# Patient Record
Sex: FEMALE | Race: WHITE
[De-identification: names, ages, dates, MRNs, and addresses within clinical notes are randomized per-mention and may not be internally consistent; named-entity substitution may affect disease eponyms.]

---

## 2022-04-18 ENCOUNTER — HOSPITAL ENCOUNTER (INPATIENT)
Dept: HOSPITAL 54 - ER | Age: 57
LOS: 14 days | Discharge: NURSING, REHAB OR LONG-TERM CARE | DRG: 720 | End: 2022-05-02
Attending: INTERNAL MEDICINE | Admitting: INTERNAL MEDICINE
Payer: COMMERCIAL

## 2022-04-18 VITALS — DIASTOLIC BLOOD PRESSURE: 53 MMHG | SYSTOLIC BLOOD PRESSURE: 97 MMHG

## 2022-04-18 VITALS — DIASTOLIC BLOOD PRESSURE: 55 MMHG | SYSTOLIC BLOOD PRESSURE: 96 MMHG

## 2022-04-18 VITALS — SYSTOLIC BLOOD PRESSURE: 97 MMHG | DIASTOLIC BLOOD PRESSURE: 68 MMHG

## 2022-04-18 VITALS — SYSTOLIC BLOOD PRESSURE: 93 MMHG | DIASTOLIC BLOOD PRESSURE: 50 MMHG

## 2022-04-18 VITALS — SYSTOLIC BLOOD PRESSURE: 108 MMHG | DIASTOLIC BLOOD PRESSURE: 53 MMHG

## 2022-04-18 VITALS — DIASTOLIC BLOOD PRESSURE: 56 MMHG | SYSTOLIC BLOOD PRESSURE: 93 MMHG

## 2022-04-18 VITALS — SYSTOLIC BLOOD PRESSURE: 106 MMHG | DIASTOLIC BLOOD PRESSURE: 48 MMHG

## 2022-04-18 VITALS — SYSTOLIC BLOOD PRESSURE: 94 MMHG | DIASTOLIC BLOOD PRESSURE: 56 MMHG

## 2022-04-18 VITALS — HEIGHT: 61 IN | WEIGHT: 214 LBS | BODY MASS INDEX: 40.4 KG/M2

## 2022-04-18 VITALS — DIASTOLIC BLOOD PRESSURE: 72 MMHG | SYSTOLIC BLOOD PRESSURE: 100 MMHG

## 2022-04-18 VITALS — DIASTOLIC BLOOD PRESSURE: 57 MMHG | SYSTOLIC BLOOD PRESSURE: 102 MMHG

## 2022-04-18 DIAGNOSIS — E87.2: ICD-10-CM

## 2022-04-18 DIAGNOSIS — Z79.84: ICD-10-CM

## 2022-04-18 DIAGNOSIS — J18.9: ICD-10-CM

## 2022-04-18 DIAGNOSIS — I31.3: ICD-10-CM

## 2022-04-18 DIAGNOSIS — I25.2: ICD-10-CM

## 2022-04-18 DIAGNOSIS — J98.11: ICD-10-CM

## 2022-04-18 DIAGNOSIS — J96.01: ICD-10-CM

## 2022-04-18 DIAGNOSIS — N26.1: ICD-10-CM

## 2022-04-18 DIAGNOSIS — Z99.2: ICD-10-CM

## 2022-04-18 DIAGNOSIS — E66.2: ICD-10-CM

## 2022-04-18 DIAGNOSIS — D69.6: ICD-10-CM

## 2022-04-18 DIAGNOSIS — J44.9: ICD-10-CM

## 2022-04-18 DIAGNOSIS — Z20.822: ICD-10-CM

## 2022-04-18 DIAGNOSIS — Z79.4: ICD-10-CM

## 2022-04-18 DIAGNOSIS — E11.22: ICD-10-CM

## 2022-04-18 DIAGNOSIS — E87.5: ICD-10-CM

## 2022-04-18 DIAGNOSIS — N17.0: ICD-10-CM

## 2022-04-18 DIAGNOSIS — R18.8: ICD-10-CM

## 2022-04-18 DIAGNOSIS — K57.30: ICD-10-CM

## 2022-04-18 DIAGNOSIS — J90: ICD-10-CM

## 2022-04-18 DIAGNOSIS — E78.5: ICD-10-CM

## 2022-04-18 DIAGNOSIS — I50.32: ICD-10-CM

## 2022-04-18 DIAGNOSIS — K83.8: ICD-10-CM

## 2022-04-18 DIAGNOSIS — J96.02: ICD-10-CM

## 2022-04-18 DIAGNOSIS — I13.2: ICD-10-CM

## 2022-04-18 DIAGNOSIS — M46.47: ICD-10-CM

## 2022-04-18 DIAGNOSIS — Z79.899: ICD-10-CM

## 2022-04-18 DIAGNOSIS — I21.A1: ICD-10-CM

## 2022-04-18 DIAGNOSIS — A41.9: Primary | ICD-10-CM

## 2022-04-18 DIAGNOSIS — D50.9: ICD-10-CM

## 2022-04-18 DIAGNOSIS — E87.70: ICD-10-CM

## 2022-04-18 DIAGNOSIS — R65.21: ICD-10-CM

## 2022-04-18 DIAGNOSIS — I70.0: ICD-10-CM

## 2022-04-18 DIAGNOSIS — Z79.51: ICD-10-CM

## 2022-04-18 DIAGNOSIS — D25.9: ICD-10-CM

## 2022-04-18 DIAGNOSIS — E11.649: ICD-10-CM

## 2022-04-18 DIAGNOSIS — R59.0: ICD-10-CM

## 2022-04-18 DIAGNOSIS — E87.1: ICD-10-CM

## 2022-04-18 LAB
ALBUMIN SERPL BCP-MCNC: 2.5 G/DL (ref 3.4–5)
ALP SERPL-CCNC: 129 U/L (ref 46–116)
ALT SERPL W P-5'-P-CCNC: 27 U/L (ref 12–78)
AST SERPL W P-5'-P-CCNC: 18 U/L (ref 15–37)
BASE EXCESS BLDA CALC-SCNC: -6.3 MMOL/L
BASOPHILS # BLD AUTO: 0.2 K/UL (ref 0–0.2)
BASOPHILS NFR BLD AUTO: 1.2 % (ref 0–2)
BILIRUB DIRECT SERPL-MCNC: 0.1 MG/DL (ref 0–0.2)
BILIRUB SERPL-MCNC: 0.4 MG/DL (ref 0.2–1)
BILIRUB UR QL STRIP: NEGATIVE
BUN SERPL-MCNC: 55 MG/DL (ref 7–18)
BUN SERPL-MCNC: 58 MG/DL (ref 7–18)
CALCIUM SERPL-MCNC: 7.8 MG/DL (ref 8.5–10.1)
CALCIUM SERPL-MCNC: 8.7 MG/DL (ref 8.5–10.1)
CHLORIDE SERPL-SCNC: 86 MMOL/L (ref 98–107)
CHLORIDE SERPL-SCNC: 90 MMOL/L (ref 98–107)
CO2 SERPL-SCNC: 20 MMOL/L (ref 21–32)
CO2 SERPL-SCNC: 23 MMOL/L (ref 21–32)
COLOR UR: YELLOW
CREAT SERPL-MCNC: 4.4 MG/DL (ref 0.6–1.3)
CREAT SERPL-MCNC: 4.5 MG/DL (ref 0.6–1.3)
EOSINOPHIL NFR BLD AUTO: 0.2 % (ref 0–6)
GLUCOSE SERPL-MCNC: 124 MG/DL (ref 74–106)
GLUCOSE SERPL-MCNC: 60 MG/DL (ref 74–106)
GLUCOSE UR STRIP-MCNC: NEGATIVE MG/DL
HCT VFR BLD AUTO: 40 % (ref 33–45)
HGB BLD-MCNC: 12.7 G/DL (ref 11.5–14.8)
INHALED O2 CONCENTRATION: 36 %
INHALED O2 FLOW RATE: 4 L/MIN (ref 0–30)
LEUKOCYTE ESTERASE UR QL STRIP: NEGATIVE
LYMPHOCYTES NFR BLD AUTO: 0.4 K/UL (ref 0.8–4.8)
LYMPHOCYTES NFR BLD AUTO: 2.3 % (ref 20–44)
MCHC RBC AUTO-ENTMCNC: 32 G/DL (ref 31–36)
MCV RBC AUTO: 78 FL (ref 82–100)
MONOCYTES NFR BLD AUTO: 1.1 K/UL (ref 0.1–1.3)
MONOCYTES NFR BLD AUTO: 6.4 % (ref 2–12)
NEUTROPHILS # BLD AUTO: 15.7 K/UL (ref 1.8–8.9)
NEUTROPHILS NFR BLD AUTO: 89.9 % (ref 43–81)
NITRITE UR QL STRIP: NEGATIVE
PCO2 TEMP ADJ BLDA: 56.6 MMHG (ref 35–45)
PH TEMP ADJ BLDA: 7.21 [PH] (ref 7.35–7.45)
PH UR STRIP: 5.5 [PH] (ref 5–8)
PLATELET # BLD AUTO: 596 K/UL (ref 150–450)
PO2 TEMP ADJ BLDA: 106.4 MMHG (ref 75–100)
POTASSIUM SERPL-SCNC: 6 MMOL/L (ref 3.5–5.1)
POTASSIUM SERPL-SCNC: 6.7 MMOL/L (ref 3.5–5.1)
PROT SERPL-MCNC: 8.4 G/DL (ref 6.4–8.2)
PROT UR QL STRIP: 100 MG/DL
RBC # BLD AUTO: 5.18 MIL/UL (ref 4–5.2)
SODIUM SERPL-SCNC: 119 MMOL/L (ref 136–145)
SODIUM SERPL-SCNC: 121 MMOL/L (ref 136–145)
UROBILINOGEN UR STRIP-MCNC: 0.2 EU/DL
WBC #/AREA URNS HPF: (no result) /HPF (ref 0–3)
WBC NRBC COR # BLD AUTO: 17.4 K/UL (ref 4.3–11)

## 2022-04-18 PROCEDURE — C1769 GUIDE WIRE: HCPCS

## 2022-04-18 PROCEDURE — C9803 HOPD COVID-19 SPEC COLLECT: HCPCS

## 2022-04-18 PROCEDURE — U0003 INFECTIOUS AGENT DETECTION BY NUCLEIC ACID (DNA OR RNA); SEVERE ACUTE RESPIRATORY SYNDROME CORONAVIRUS 2 (SARS-COV-2) (CORONAVIRUS DISEASE [COVID-19]), AMPLIFIED PROBE TECHNIQUE, MAKING USE OF HIGH THROUGHPUT TECHNOLOGIES AS DESCRIBED BY CMS-2020-01-R: HCPCS

## 2022-04-18 PROCEDURE — C1894 INTRO/SHEATH, NON-LASER: HCPCS

## 2022-04-18 PROCEDURE — G0378 HOSPITAL OBSERVATION PER HR: HCPCS

## 2022-04-18 PROCEDURE — C1750 CATH, HEMODIALYSIS,LONG-TERM: HCPCS

## 2022-04-18 PROCEDURE — A6253 ABSORPT DRG > 48 SQ IN W/O B: HCPCS

## 2022-04-18 PROCEDURE — C9113 INJ PANTOPRAZOLE SODIUM, VIA: HCPCS

## 2022-04-18 RX ADMIN — INSULIN GLARGINE SCH UNIT: 100 INJECTION, SOLUTION SUBCUTANEOUS at 22:58

## 2022-04-18 RX ADMIN — PIPERACILLIN SODIUM AND TAZOBACTAM SODIUM SCH MLS/HR: .375; 3 INJECTION, POWDER, LYOPHILIZED, FOR SOLUTION INTRAVENOUS at 18:01

## 2022-04-18 RX ADMIN — PIPERACILLIN SODIUM AND TAZOBACTAM SODIUM SCH MLS/HR: .375; 3 INJECTION, POWDER, LYOPHILIZED, FOR SOLUTION INTRAVENOUS at 23:58

## 2022-04-18 RX ADMIN — HEPARIN SODIUM SCH UNITS: 5000 INJECTION INTRAVENOUS; SUBCUTANEOUS at 16:57

## 2022-04-18 RX ADMIN — DEXTROSE AND SODIUM CHLORIDE PRN MLS/HR: 5; 900 INJECTION, SOLUTION INTRAVENOUS at 18:02

## 2022-04-18 NOTE — NUR
RN NOTE



NG TUBE PLACED RIGHT NARE AT 65CM. POSITIVE PLACEMENT CONFIRMED VIA AUSCULTATION, CONFORMED 
WITH SAM CHARGE NURSE.

## 2022-04-18 NOTE — NUR
LEONORA 40 FROM [address] [place] [address] C/O LOW O2 SAT 70% ON RA. PATIENT DOES NOT 
RESPOND TO VERBAL STIMULI BUT WITHDRAWS TO PAINFUL STIMULI. BS 97mg/dl. PLACED 
COMFORTABLY IN BED. VITALS CHECKED. PATIENT'S SPO2 94 WITH O2 CANNULA AT4LPM.

## 2022-04-18 NOTE — NUR
CALLED FABI FROM RADIOLOGY. EXPLAINED TO HIM PATIENT IS REALLY A HARD STICK. I 
HAVE 2 LINES LEFT WRIST G20 AND RIGHT HAND G20. SPOKE TO DR DUENAS HE SAID 
TO DO THE CT SCAN WITHOUT CONTRAST AT THE MOMENT. MIDLINE NURSE WILL COME 7PM 
TO PUT LINE ON PATIENT.

## 2022-04-18 NOTE — NUR
RN/ICUADMITTED THIS 57 Y/O FEMALE FROM ER PER ACLS PROTOCOL. DX:ACUTE RESPIRATORY FAILURE, 
PNA AND ACUTE RENAL FAILURE. ROUTINE ICU ADMISSION CARE INITIATED. PT. COVID 19 PCR PENDING, 
COVID RAPID IS NEGATIVE, ON CONTACT ISOLATION FOR NOW. PT. IS AWAKE, ORIENTED ONLY TO SELF, 
NAME,FOLLOWS SIMPLE COMMANDS . EKG SR W/ HR-93/MIN. BP-109/64. ON 4L/NC, SATS.-93%. DENIES 
PAIN OR DISTRESS, WILL CONTINUE TO MONITOR  AND WILL REFER ACCORDINGLY AS NEEDED. PT. IS A 
FULL CODE. PT. IS FOR MIDLINE INSERTION. AWAITING FOR PICC RN.

## 2022-04-18 NOTE — NUR
INFORMED DR DUENAS OF 4.5 CREATININE FOR PULMONARY ANGIOGRAM, MD SAID "IT'S 
FINE". MADE RADIOLOGY DEPT AWARE

## 2022-04-18 NOTE — NUR
RN NOTE



RELAYED BMP RESULT TO DR. BLANKA MAN, K-6.0 WITH NEW ORDERS RECEIVED NOTED AND CARRIED 
OUT.

## 2022-04-18 NOTE — NUR
RN/ICU-DR. SUMMERS TO SEE PT, ASSESSED PT. SPOKE TO DR JOEL REGARDING POSSIBLILTY OF 
AIR IN ABDOMEN, WILL COME AND SEE PT TONIGHT.

## 2022-04-18 NOTE — NUR
RN NOTE



PATIENT RESTING IN BED, ALERT AND ORIENTED X1. ON O2 3L VIA NASAL CANNULA, NO S/S OF 
RESPIRATORY DISTRESS. DENIES ANY PAIN OR DISCOMFORT. RICHMOND CATH IN PLACE, DRAINING URINE VIA 
GRAVITY. IV ACCESS ON LEFT WRIST #20 AND RIGHT HAND # 20 PATENT AND INTACT, INFUSING D5NS @ 
80ML/HR. NO S/S OF INFILTRATION. BED LOCKED AND IN LOWEST POSITION. CALL LIGHT WITHIN REACH. 
ALL NEEDS ANTICIPATED.

## 2022-04-19 VITALS — SYSTOLIC BLOOD PRESSURE: 100 MMHG | DIASTOLIC BLOOD PRESSURE: 50 MMHG

## 2022-04-19 VITALS — DIASTOLIC BLOOD PRESSURE: 52 MMHG | SYSTOLIC BLOOD PRESSURE: 85 MMHG

## 2022-04-19 VITALS — SYSTOLIC BLOOD PRESSURE: 133 MMHG | DIASTOLIC BLOOD PRESSURE: 81 MMHG

## 2022-04-19 VITALS — DIASTOLIC BLOOD PRESSURE: 64 MMHG | SYSTOLIC BLOOD PRESSURE: 90 MMHG

## 2022-04-19 VITALS — DIASTOLIC BLOOD PRESSURE: 72 MMHG | SYSTOLIC BLOOD PRESSURE: 97 MMHG

## 2022-04-19 VITALS — DIASTOLIC BLOOD PRESSURE: 75 MMHG | SYSTOLIC BLOOD PRESSURE: 119 MMHG

## 2022-04-19 VITALS — DIASTOLIC BLOOD PRESSURE: 61 MMHG | SYSTOLIC BLOOD PRESSURE: 95 MMHG

## 2022-04-19 VITALS — SYSTOLIC BLOOD PRESSURE: 82 MMHG | DIASTOLIC BLOOD PRESSURE: 64 MMHG

## 2022-04-19 VITALS — SYSTOLIC BLOOD PRESSURE: 92 MMHG | DIASTOLIC BLOOD PRESSURE: 64 MMHG

## 2022-04-19 VITALS — SYSTOLIC BLOOD PRESSURE: 79 MMHG | DIASTOLIC BLOOD PRESSURE: 45 MMHG

## 2022-04-19 VITALS — DIASTOLIC BLOOD PRESSURE: 54 MMHG | SYSTOLIC BLOOD PRESSURE: 83 MMHG

## 2022-04-19 VITALS — SYSTOLIC BLOOD PRESSURE: 94 MMHG | DIASTOLIC BLOOD PRESSURE: 62 MMHG

## 2022-04-19 VITALS — SYSTOLIC BLOOD PRESSURE: 126 MMHG | DIASTOLIC BLOOD PRESSURE: 79 MMHG

## 2022-04-19 VITALS — DIASTOLIC BLOOD PRESSURE: 65 MMHG | SYSTOLIC BLOOD PRESSURE: 97 MMHG

## 2022-04-19 VITALS — SYSTOLIC BLOOD PRESSURE: 90 MMHG | DIASTOLIC BLOOD PRESSURE: 59 MMHG

## 2022-04-19 VITALS — DIASTOLIC BLOOD PRESSURE: 76 MMHG | SYSTOLIC BLOOD PRESSURE: 118 MMHG

## 2022-04-19 VITALS — DIASTOLIC BLOOD PRESSURE: 76 MMHG | SYSTOLIC BLOOD PRESSURE: 116 MMHG

## 2022-04-19 VITALS — SYSTOLIC BLOOD PRESSURE: 147 MMHG | DIASTOLIC BLOOD PRESSURE: 84 MMHG

## 2022-04-19 VITALS — DIASTOLIC BLOOD PRESSURE: 75 MMHG | SYSTOLIC BLOOD PRESSURE: 130 MMHG

## 2022-04-19 VITALS — DIASTOLIC BLOOD PRESSURE: 82 MMHG | SYSTOLIC BLOOD PRESSURE: 133 MMHG

## 2022-04-19 VITALS — DIASTOLIC BLOOD PRESSURE: 59 MMHG | SYSTOLIC BLOOD PRESSURE: 90 MMHG

## 2022-04-19 VITALS — SYSTOLIC BLOOD PRESSURE: 75 MMHG | DIASTOLIC BLOOD PRESSURE: 46 MMHG

## 2022-04-19 VITALS — DIASTOLIC BLOOD PRESSURE: 85 MMHG | SYSTOLIC BLOOD PRESSURE: 143 MMHG

## 2022-04-19 VITALS — SYSTOLIC BLOOD PRESSURE: 101 MMHG | DIASTOLIC BLOOD PRESSURE: 57 MMHG

## 2022-04-19 VITALS — DIASTOLIC BLOOD PRESSURE: 52 MMHG | SYSTOLIC BLOOD PRESSURE: 86 MMHG

## 2022-04-19 VITALS — DIASTOLIC BLOOD PRESSURE: 81 MMHG | SYSTOLIC BLOOD PRESSURE: 123 MMHG

## 2022-04-19 VITALS — SYSTOLIC BLOOD PRESSURE: 148 MMHG | DIASTOLIC BLOOD PRESSURE: 88 MMHG

## 2022-04-19 VITALS — DIASTOLIC BLOOD PRESSURE: 51 MMHG | SYSTOLIC BLOOD PRESSURE: 88 MMHG

## 2022-04-19 VITALS — DIASTOLIC BLOOD PRESSURE: 60 MMHG | SYSTOLIC BLOOD PRESSURE: 80 MMHG

## 2022-04-19 VITALS — SYSTOLIC BLOOD PRESSURE: 111 MMHG | DIASTOLIC BLOOD PRESSURE: 70 MMHG

## 2022-04-19 VITALS — SYSTOLIC BLOOD PRESSURE: 123 MMHG | DIASTOLIC BLOOD PRESSURE: 77 MMHG

## 2022-04-19 VITALS — DIASTOLIC BLOOD PRESSURE: 66 MMHG | SYSTOLIC BLOOD PRESSURE: 95 MMHG

## 2022-04-19 VITALS — SYSTOLIC BLOOD PRESSURE: 86 MMHG | DIASTOLIC BLOOD PRESSURE: 50 MMHG

## 2022-04-19 VITALS — SYSTOLIC BLOOD PRESSURE: 101 MMHG | DIASTOLIC BLOOD PRESSURE: 63 MMHG

## 2022-04-19 VITALS — SYSTOLIC BLOOD PRESSURE: 125 MMHG | DIASTOLIC BLOOD PRESSURE: 75 MMHG

## 2022-04-19 VITALS — DIASTOLIC BLOOD PRESSURE: 73 MMHG | SYSTOLIC BLOOD PRESSURE: 107 MMHG

## 2022-04-19 VITALS — DIASTOLIC BLOOD PRESSURE: 67 MMHG | SYSTOLIC BLOOD PRESSURE: 111 MMHG

## 2022-04-19 VITALS — SYSTOLIC BLOOD PRESSURE: 85 MMHG | DIASTOLIC BLOOD PRESSURE: 54 MMHG

## 2022-04-19 VITALS — DIASTOLIC BLOOD PRESSURE: 78 MMHG | SYSTOLIC BLOOD PRESSURE: 134 MMHG

## 2022-04-19 VITALS — DIASTOLIC BLOOD PRESSURE: 72 MMHG | SYSTOLIC BLOOD PRESSURE: 101 MMHG

## 2022-04-19 VITALS — DIASTOLIC BLOOD PRESSURE: 52 MMHG | SYSTOLIC BLOOD PRESSURE: 83 MMHG

## 2022-04-19 VITALS — DIASTOLIC BLOOD PRESSURE: 74 MMHG | SYSTOLIC BLOOD PRESSURE: 120 MMHG

## 2022-04-19 VITALS — DIASTOLIC BLOOD PRESSURE: 66 MMHG | SYSTOLIC BLOOD PRESSURE: 100 MMHG

## 2022-04-19 VITALS — SYSTOLIC BLOOD PRESSURE: 122 MMHG | DIASTOLIC BLOOD PRESSURE: 75 MMHG

## 2022-04-19 VITALS — DIASTOLIC BLOOD PRESSURE: 57 MMHG | SYSTOLIC BLOOD PRESSURE: 87 MMHG

## 2022-04-19 VITALS — DIASTOLIC BLOOD PRESSURE: 64 MMHG | SYSTOLIC BLOOD PRESSURE: 104 MMHG

## 2022-04-19 VITALS — SYSTOLIC BLOOD PRESSURE: 159 MMHG | DIASTOLIC BLOOD PRESSURE: 86 MMHG

## 2022-04-19 VITALS — SYSTOLIC BLOOD PRESSURE: 95 MMHG | DIASTOLIC BLOOD PRESSURE: 63 MMHG

## 2022-04-19 VITALS — DIASTOLIC BLOOD PRESSURE: 63 MMHG | SYSTOLIC BLOOD PRESSURE: 119 MMHG

## 2022-04-19 VITALS — SYSTOLIC BLOOD PRESSURE: 89 MMHG | DIASTOLIC BLOOD PRESSURE: 52 MMHG

## 2022-04-19 VITALS — DIASTOLIC BLOOD PRESSURE: 75 MMHG | SYSTOLIC BLOOD PRESSURE: 114 MMHG

## 2022-04-19 VITALS — SYSTOLIC BLOOD PRESSURE: 92 MMHG | DIASTOLIC BLOOD PRESSURE: 53 MMHG

## 2022-04-19 VITALS — SYSTOLIC BLOOD PRESSURE: 91 MMHG | DIASTOLIC BLOOD PRESSURE: 52 MMHG

## 2022-04-19 VITALS — SYSTOLIC BLOOD PRESSURE: 117 MMHG | DIASTOLIC BLOOD PRESSURE: 72 MMHG

## 2022-04-19 VITALS — SYSTOLIC BLOOD PRESSURE: 106 MMHG | DIASTOLIC BLOOD PRESSURE: 57 MMHG

## 2022-04-19 VITALS — SYSTOLIC BLOOD PRESSURE: 111 MMHG | DIASTOLIC BLOOD PRESSURE: 73 MMHG

## 2022-04-19 VITALS — SYSTOLIC BLOOD PRESSURE: 90 MMHG | DIASTOLIC BLOOD PRESSURE: 52 MMHG

## 2022-04-19 VITALS — DIASTOLIC BLOOD PRESSURE: 87 MMHG | SYSTOLIC BLOOD PRESSURE: 128 MMHG

## 2022-04-19 VITALS — DIASTOLIC BLOOD PRESSURE: 79 MMHG | SYSTOLIC BLOOD PRESSURE: 158 MMHG

## 2022-04-19 VITALS — SYSTOLIC BLOOD PRESSURE: 82 MMHG | DIASTOLIC BLOOD PRESSURE: 53 MMHG

## 2022-04-19 VITALS — SYSTOLIC BLOOD PRESSURE: 84 MMHG | DIASTOLIC BLOOD PRESSURE: 66 MMHG

## 2022-04-19 VITALS — SYSTOLIC BLOOD PRESSURE: 83 MMHG | DIASTOLIC BLOOD PRESSURE: 58 MMHG

## 2022-04-19 VITALS — DIASTOLIC BLOOD PRESSURE: 62 MMHG | SYSTOLIC BLOOD PRESSURE: 92 MMHG

## 2022-04-19 VITALS — SYSTOLIC BLOOD PRESSURE: 95 MMHG | DIASTOLIC BLOOD PRESSURE: 60 MMHG

## 2022-04-19 VITALS — DIASTOLIC BLOOD PRESSURE: 56 MMHG | SYSTOLIC BLOOD PRESSURE: 91 MMHG

## 2022-04-19 VITALS — SYSTOLIC BLOOD PRESSURE: 84 MMHG | DIASTOLIC BLOOD PRESSURE: 54 MMHG

## 2022-04-19 VITALS — SYSTOLIC BLOOD PRESSURE: 90 MMHG | DIASTOLIC BLOOD PRESSURE: 49 MMHG

## 2022-04-19 VITALS — DIASTOLIC BLOOD PRESSURE: 85 MMHG | SYSTOLIC BLOOD PRESSURE: 122 MMHG

## 2022-04-19 VITALS — SYSTOLIC BLOOD PRESSURE: 126 MMHG | DIASTOLIC BLOOD PRESSURE: 71 MMHG

## 2022-04-19 VITALS — DIASTOLIC BLOOD PRESSURE: 56 MMHG | SYSTOLIC BLOOD PRESSURE: 88 MMHG

## 2022-04-19 VITALS — SYSTOLIC BLOOD PRESSURE: 95 MMHG | DIASTOLIC BLOOD PRESSURE: 64 MMHG

## 2022-04-19 VITALS — SYSTOLIC BLOOD PRESSURE: 88 MMHG | DIASTOLIC BLOOD PRESSURE: 55 MMHG

## 2022-04-19 VITALS — SYSTOLIC BLOOD PRESSURE: 89 MMHG | DIASTOLIC BLOOD PRESSURE: 55 MMHG

## 2022-04-19 VITALS — SYSTOLIC BLOOD PRESSURE: 100 MMHG | DIASTOLIC BLOOD PRESSURE: 63 MMHG

## 2022-04-19 VITALS — DIASTOLIC BLOOD PRESSURE: 50 MMHG | SYSTOLIC BLOOD PRESSURE: 86 MMHG

## 2022-04-19 VITALS — DIASTOLIC BLOOD PRESSURE: 69 MMHG | SYSTOLIC BLOOD PRESSURE: 103 MMHG

## 2022-04-19 LAB
ALBUMIN SERPL BCP-MCNC: 2 G/DL (ref 3.4–5)
ALP SERPL-CCNC: 94 U/L (ref 46–116)
ALT SERPL W P-5'-P-CCNC: 18 U/L (ref 12–78)
AST SERPL W P-5'-P-CCNC: 15 U/L (ref 15–37)
BASE EXCESS BLDA CALC-SCNC: -10.1 MMOL/L
BASE EXCESS BLDA CALC-SCNC: -9.2 MMOL/L
BASE EXCESS BLDA CALC-SCNC: -9.8 MMOL/L
BASOPHILS # BLD AUTO: 0 K/UL (ref 0–0.2)
BASOPHILS NFR BLD AUTO: 0.2 % (ref 0–2)
BILIRUB SERPL-MCNC: 0.4 MG/DL (ref 0.2–1)
BUN SERPL-MCNC: 59 MG/DL (ref 7–18)
BUN SERPL-MCNC: 61 MG/DL (ref 7–18)
CALCIUM SERPL-MCNC: 7.3 MG/DL (ref 8.5–10.1)
CALCIUM SERPL-MCNC: 7.8 MG/DL (ref 8.5–10.1)
CHLORIDE SERPL-SCNC: 90 MMOL/L (ref 98–107)
CHLORIDE SERPL-SCNC: 90 MMOL/L (ref 98–107)
CO2 SERPL-SCNC: 20 MMOL/L (ref 21–32)
CO2 SERPL-SCNC: 21 MMOL/L (ref 21–32)
CREAT SERPL-MCNC: 4.8 MG/DL (ref 0.6–1.3)
CREAT SERPL-MCNC: 5.3 MG/DL (ref 0.6–1.3)
DO-HGB MFR BLDA: 50.8 MMHG
DO-HGB MFR BLDA: 52 MMHG
DO-HGB MFR BLDA: 69.9 MMHG
EOSINOPHIL NFR BLD AUTO: 0.7 % (ref 0–6)
FERRITIN SERPL-MCNC: 388 NG/ML (ref 8–388)
GLUCOSE SERPL-MCNC: 152 MG/DL (ref 74–106)
GLUCOSE SERPL-MCNC: 50 MG/DL (ref 74–106)
HCT VFR BLD AUTO: 35 % (ref 33–45)
HGB BLD-MCNC: 10.9 G/DL (ref 11.5–14.8)
INHALED O2 CONCENTRATION: 28 %
INHALED O2 CONCENTRATION: 28 %
INHALED O2 CONCENTRATION: 30 %
IRON SERPL-MCNC: 11 UG/DL (ref 50–175)
LYMPHOCYTES NFR BLD AUTO: 0.8 K/UL (ref 0.8–4.8)
LYMPHOCYTES NFR BLD AUTO: 4.6 % (ref 20–44)
MCHC RBC AUTO-ENTMCNC: 31 G/DL (ref 31–36)
MCV RBC AUTO: 78 FL (ref 82–100)
MONOCYTES NFR BLD AUTO: 1.8 K/UL (ref 0.1–1.3)
MONOCYTES NFR BLD AUTO: 10.5 % (ref 2–12)
NEUTROPHILS # BLD AUTO: 14.3 K/UL (ref 1.8–8.9)
NEUTROPHILS NFR BLD AUTO: 84 % (ref 43–81)
PCO2 TEMP ADJ BLDA: 30.8 MMHG (ref 35–45)
PCO2 TEMP ADJ BLDA: 52.7 MMHG (ref 35–45)
PCO2 TEMP ADJ BLDA: 62.4 MMHG (ref 35–45)
PH TEMP ADJ BLDA: 7.12 [PH] (ref 7.35–7.45)
PH TEMP ADJ BLDA: 7.17 [PH] (ref 7.35–7.45)
PH TEMP ADJ BLDA: 7.32 [PH] (ref 7.35–7.45)
PLATELET # BLD AUTO: 468 K/UL (ref 150–450)
PO2 TEMP ADJ BLDA: 107.8 MMHG (ref 75–100)
PO2 TEMP ADJ BLDA: 74 MMHG (ref 75–100)
PO2 TEMP ADJ BLDA: 86.7 MMHG (ref 75–100)
POTASSIUM SERPL-SCNC: 5.2 MMOL/L (ref 3.5–5.1)
POTASSIUM SERPL-SCNC: 5.3 MMOL/L (ref 3.5–5.1)
PROT SERPL-MCNC: 6.9 G/DL (ref 6.4–8.2)
RBC # BLD AUTO: 4.52 MIL/UL (ref 4–5.2)
SAO2 % BLDA: 92.3 % (ref 92–98.5)
SAO2 % BLDA: 95.6 % (ref 92–98.5)
SAO2 % BLDA: 98 % (ref 92–98.5)
SODIUM SERPL-SCNC: 122 MMOL/L (ref 136–145)
SODIUM SERPL-SCNC: 123 MMOL/L (ref 136–145)
TIBC SERPL-MCNC: 237 UG/DL (ref 250–450)
TSH SERPL DL<=0.005 MIU/L-ACNC: 0.77 UIU/ML (ref 0.36–3.74)
VENTILATION MODE VENT: (no result)
WBC NRBC COR # BLD AUTO: 17 K/UL (ref 4.3–11)

## 2022-04-19 PROCEDURE — 5A1D70Z PERFORMANCE OF URINARY FILTRATION, INTERMITTENT, LESS THAN 6 HOURS PER DAY: ICD-10-PCS

## 2022-04-19 PROCEDURE — B543ZZA ULTRASONOGRAPHY OF RIGHT JUGULAR VEINS, GUIDANCE: ICD-10-PCS | Performed by: NURSE PRACTITIONER

## 2022-04-19 PROCEDURE — 0BH18EZ INSERTION OF ENDOTRACHEAL AIRWAY INTO TRACHEA, VIA NATURAL OR ARTIFICIAL OPENING ENDOSCOPIC: ICD-10-PCS | Performed by: NURSE PRACTITIONER

## 2022-04-19 PROCEDURE — 05HM33Z INSERTION OF INFUSION DEVICE INTO RIGHT INTERNAL JUGULAR VEIN, PERCUTANEOUS APPROACH: ICD-10-PCS | Performed by: NURSE PRACTITIONER

## 2022-04-19 PROCEDURE — 5A1945Z RESPIRATORY VENTILATION, 24-96 CONSECUTIVE HOURS: ICD-10-PCS | Performed by: INTERNAL MEDICINE

## 2022-04-19 PROCEDURE — 0W9B3ZZ DRAINAGE OF LEFT PLEURAL CAVITY, PERCUTANEOUS APPROACH: ICD-10-PCS

## 2022-04-19 PROCEDURE — 05HD33Z INSERTION OF INFUSION DEVICE INTO RIGHT CEPHALIC VEIN, PERCUTANEOUS APPROACH: ICD-10-PCS | Performed by: NURSE PRACTITIONER

## 2022-04-19 RX ADMIN — DEXTROSE MONOHYDRATE SCH MLS/HR: 50 INJECTION, SOLUTION INTRAVENOUS at 09:24

## 2022-04-19 RX ADMIN — DEXTROSE AND SODIUM CHLORIDE PRN MLS/HR: 5; 900 INJECTION, SOLUTION INTRAVENOUS at 19:28

## 2022-04-19 RX ADMIN — SODIUM CHLORIDE PRN MLS/HR: 9 INJECTION, SOLUTION INTRAVENOUS at 13:39

## 2022-04-19 RX ADMIN — Medication SCH EACH: at 17:12

## 2022-04-19 RX ADMIN — Medication SCH EACH: at 12:52

## 2022-04-19 RX ADMIN — PROPOFOL PRN MLS/HR: 10 INJECTION, EMULSION INTRAVENOUS at 22:21

## 2022-04-19 RX ADMIN — INSULIN GLARGINE SCH UNIT: 100 INJECTION, SOLUTION SUBCUTANEOUS at 21:57

## 2022-04-19 RX ADMIN — PIPERACILLIN SODIUM AND TAZOBACTAM SODIUM SCH MLS/HR: .375; 3 INJECTION, POWDER, LYOPHILIZED, FOR SOLUTION INTRAVENOUS at 11:24

## 2022-04-19 RX ADMIN — HEPARIN SODIUM SCH UNITS: 5000 INJECTION INTRAVENOUS; SUBCUTANEOUS at 08:13

## 2022-04-19 RX ADMIN — PROPOFOL PRN MLS/HR: 10 INJECTION, EMULSION INTRAVENOUS at 15:59

## 2022-04-19 RX ADMIN — SODIUM CHLORIDE SCH MG: 9 INJECTION, SOLUTION INTRAVENOUS at 08:12

## 2022-04-19 RX ADMIN — PIPERACILLIN SODIUM AND TAZOBACTAM SODIUM SCH MLS/HR: .375; 3 INJECTION, POWDER, LYOPHILIZED, FOR SOLUTION INTRAVENOUS at 06:16

## 2022-04-19 RX ADMIN — DEXTROSE AND SODIUM CHLORIDE PRN MLS/HR: 5; 900 INJECTION, SOLUTION INTRAVENOUS at 06:21

## 2022-04-19 RX ADMIN — PIPERACILLIN SODIUM AND TAZOBACTAM SODIUM SCH MLS/HR: .375; 3 INJECTION, POWDER, LYOPHILIZED, FOR SOLUTION INTRAVENOUS at 17:09

## 2022-04-19 RX ADMIN — HEPARIN SODIUM SCH UNITS: 5000 INJECTION INTRAVENOUS; SUBCUTANEOUS at 17:06

## 2022-04-19 NOTE — NUR
RN NOTE



PATIENT RESTING IN BED, ALERT AND ORIENTED X1. ON O2 2L VIA NASAL CANNULA. POOR OUTPUT FROM 
RICHMOND CATH, 30CC.  IV ACCESS ON LEFT WRIST #20, RIGHT HAND # 20, AND SAMARA MIDLINE #18 PATENT 
AND INTACT, INFUSING D5NS @ 80ML/HR. NO S/S OF INFILTRATION. HAD BM X3, KEPT CLEAN AND DRY. 
TURNED AND REPOSITIONED. BED LOCKED AND IN LOWEST POSITION. CALL LIGHT WITHIN REACH. 
ENDORSED TO AM SHIFT.

## 2022-04-19 NOTE — NUR
RT

PER MD ORDER ETT PULLED BACK 2CM AND SECURED AT 21CM TOP LIP.

-------------------------------------------------------------------------------

Addendum: 04/19/22 at 1611 by DIDI WALKER RT

-------------------------------------------------------------------------------

Amended: Links added.

## 2022-04-19 NOTE — NUR
RN NOTE



PATIENT'S GLUCOSE 50 AND TROPONIN 137.3. RECHECKED BLOOD SUGAR 58. DR. BLANKA MAN MADE 
AWARE WITH NEW ORDERS NOTED AND CARRIED OUT. ALSO MADE MD AWARE OF PATIENTS NA, K, AND URINE 
OUTPUT 30CC THROUGH OUT SHIFT.

## 2022-04-19 NOTE — NUR
RN NOTE



DR. BLANKA MAN AWARE OF PATIENT'S BLOOD SUGAR THIS EVENING 56  AFTER 
REASSESSMENT.



INFORMED DR. BLANKA MAN PATIENT'S BLOOD PRESSURE 85/55, 83/54. WITH NO NEW ORDERS AT 
THIS TIME. CONTINUE TO MONITOR. CHARGE NURSE SAM AWARE.

## 2022-04-19 NOTE — NUR
RT

PATIENT ORALLY INTUBATED WITH 7.5 ETT SECURED AT 23CM AT THE LIP. VENT SETTINGS SET PER DR FENG. POSITIVE CO2 DETECTOR COLOR CHANGE. BILAT BREATH SOUNDS HEARD. BILAT CHEST RISE 
NOTED. AMBU BAG AT HOB

-------------------------------------------------------------------------------

Addendum: 04/19/22 at 1545 by DIDI WALKER RT

-------------------------------------------------------------------------------

Amended: Links added.

## 2022-04-19 NOTE — NUR
RN NOTES 

PT REMAINS INTUBATED AND SEDATED , ON PROPOFOL AT 15MCG/KG/MIN, TOLERATED VENT SETTING WELL, 
NO DISTESS NOTED, ON TELE SR HR IN 80'S , NGT TO LIS, LEVO AT .04 MCG/KG/MIN FOR BP SUPPORT, 
IVF D5NS AT 125CC/HR RUNNING VIA R UPPER ARM MIDLINE . SR UP x3, CALL LIGHT WITHIN EASY 
REACH, BED LOCKED AND IN LOWEST POSITION, WILL ENDORSE TO NIGHT SHIFT NURSE FOR CONTINUITY 
OF CARE .

## 2022-04-19 NOTE — NUR
RN NOTES 

BG 48 , NO SIGNS AND SYMPTOMS OF HYPOGLYCEMIA NOTED , SBP IN 80'S ,  DR JAQUEZ NOTIFIED , D50 
IV x 1 AMP AND LEVO DRIP  ORDERED , CONTINUE TO MONITOR.

## 2022-04-19 NOTE — NUR
RN NOTE

RECEIVED PT ON BED ALERT AND ORIENTED X1. ON O2 2L VIA NASAL CANNULA, NO S/S OF RESPIRATORY 
DISTRESS. DENIES ANY PAIN OR DISCOMFORT. RICHMOND CATH IN PLACE, DRAINING URINE VIA GRAVITY. IV 
ACCESS ON LEFT WRIST #20 AND RIGHT HAND # 20 PATENT AND INTACT, INFUSING D5NS @ 80ML/HR. NO 
S/S OF INFILTRATION. NGT ATTACHED TO LIS , NO DRAINAGE  NOTED , BED LOCKED AND IN LOWEST 
POSITION. CALL LIGHT WITHIN REACH. WILL CONTINUE TO MONITOR

## 2022-04-19 NOTE — NUR
RN NOTES 

PT IS LETHARGIC , DOES NOT FOLLOW COMMAND, DR FENG NOTIFIED REGARDING ABG RESULTS , ORDER 
RECEIVED TO INTUBATE PT . CONTINUE TO MONITOR.

## 2022-04-20 VITALS — SYSTOLIC BLOOD PRESSURE: 95 MMHG | DIASTOLIC BLOOD PRESSURE: 60 MMHG

## 2022-04-20 VITALS — DIASTOLIC BLOOD PRESSURE: 59 MMHG | SYSTOLIC BLOOD PRESSURE: 100 MMHG

## 2022-04-20 VITALS — DIASTOLIC BLOOD PRESSURE: 74 MMHG | SYSTOLIC BLOOD PRESSURE: 132 MMHG

## 2022-04-20 VITALS — SYSTOLIC BLOOD PRESSURE: 130 MMHG | DIASTOLIC BLOOD PRESSURE: 74 MMHG

## 2022-04-20 VITALS — SYSTOLIC BLOOD PRESSURE: 95 MMHG | DIASTOLIC BLOOD PRESSURE: 63 MMHG

## 2022-04-20 VITALS — SYSTOLIC BLOOD PRESSURE: 84 MMHG | DIASTOLIC BLOOD PRESSURE: 48 MMHG

## 2022-04-20 VITALS — SYSTOLIC BLOOD PRESSURE: 99 MMHG | DIASTOLIC BLOOD PRESSURE: 63 MMHG

## 2022-04-20 VITALS — DIASTOLIC BLOOD PRESSURE: 57 MMHG | SYSTOLIC BLOOD PRESSURE: 81 MMHG

## 2022-04-20 VITALS — DIASTOLIC BLOOD PRESSURE: 78 MMHG | SYSTOLIC BLOOD PRESSURE: 121 MMHG

## 2022-04-20 VITALS — DIASTOLIC BLOOD PRESSURE: 60 MMHG | SYSTOLIC BLOOD PRESSURE: 86 MMHG

## 2022-04-20 VITALS — SYSTOLIC BLOOD PRESSURE: 152 MMHG | DIASTOLIC BLOOD PRESSURE: 74 MMHG

## 2022-04-20 VITALS — DIASTOLIC BLOOD PRESSURE: 57 MMHG | SYSTOLIC BLOOD PRESSURE: 90 MMHG

## 2022-04-20 VITALS — DIASTOLIC BLOOD PRESSURE: 58 MMHG | SYSTOLIC BLOOD PRESSURE: 86 MMHG

## 2022-04-20 VITALS — SYSTOLIC BLOOD PRESSURE: 121 MMHG | DIASTOLIC BLOOD PRESSURE: 65 MMHG

## 2022-04-20 VITALS — SYSTOLIC BLOOD PRESSURE: 87 MMHG | DIASTOLIC BLOOD PRESSURE: 60 MMHG

## 2022-04-20 VITALS — SYSTOLIC BLOOD PRESSURE: 124 MMHG | DIASTOLIC BLOOD PRESSURE: 76 MMHG

## 2022-04-20 VITALS — DIASTOLIC BLOOD PRESSURE: 57 MMHG | SYSTOLIC BLOOD PRESSURE: 91 MMHG

## 2022-04-20 VITALS — SYSTOLIC BLOOD PRESSURE: 118 MMHG | DIASTOLIC BLOOD PRESSURE: 70 MMHG

## 2022-04-20 VITALS — DIASTOLIC BLOOD PRESSURE: 48 MMHG | SYSTOLIC BLOOD PRESSURE: 85 MMHG

## 2022-04-20 VITALS — DIASTOLIC BLOOD PRESSURE: 78 MMHG | SYSTOLIC BLOOD PRESSURE: 112 MMHG

## 2022-04-20 VITALS — SYSTOLIC BLOOD PRESSURE: 81 MMHG | DIASTOLIC BLOOD PRESSURE: 50 MMHG

## 2022-04-20 VITALS — SYSTOLIC BLOOD PRESSURE: 110 MMHG | DIASTOLIC BLOOD PRESSURE: 72 MMHG

## 2022-04-20 VITALS — SYSTOLIC BLOOD PRESSURE: 116 MMHG | DIASTOLIC BLOOD PRESSURE: 67 MMHG

## 2022-04-20 VITALS — DIASTOLIC BLOOD PRESSURE: 51 MMHG | SYSTOLIC BLOOD PRESSURE: 90 MMHG

## 2022-04-20 VITALS — SYSTOLIC BLOOD PRESSURE: 113 MMHG | DIASTOLIC BLOOD PRESSURE: 64 MMHG

## 2022-04-20 VITALS — SYSTOLIC BLOOD PRESSURE: 92 MMHG | DIASTOLIC BLOOD PRESSURE: 66 MMHG

## 2022-04-20 VITALS — SYSTOLIC BLOOD PRESSURE: 93 MMHG | DIASTOLIC BLOOD PRESSURE: 63 MMHG

## 2022-04-20 VITALS — DIASTOLIC BLOOD PRESSURE: 63 MMHG | SYSTOLIC BLOOD PRESSURE: 111 MMHG

## 2022-04-20 VITALS — DIASTOLIC BLOOD PRESSURE: 56 MMHG | SYSTOLIC BLOOD PRESSURE: 89 MMHG

## 2022-04-20 VITALS — SYSTOLIC BLOOD PRESSURE: 100 MMHG | DIASTOLIC BLOOD PRESSURE: 70 MMHG

## 2022-04-20 VITALS — SYSTOLIC BLOOD PRESSURE: 111 MMHG | DIASTOLIC BLOOD PRESSURE: 63 MMHG

## 2022-04-20 VITALS — SYSTOLIC BLOOD PRESSURE: 102 MMHG | DIASTOLIC BLOOD PRESSURE: 63 MMHG

## 2022-04-20 VITALS — DIASTOLIC BLOOD PRESSURE: 55 MMHG | SYSTOLIC BLOOD PRESSURE: 90 MMHG

## 2022-04-20 VITALS — DIASTOLIC BLOOD PRESSURE: 67 MMHG | SYSTOLIC BLOOD PRESSURE: 112 MMHG

## 2022-04-20 VITALS — DIASTOLIC BLOOD PRESSURE: 64 MMHG | SYSTOLIC BLOOD PRESSURE: 113 MMHG

## 2022-04-20 VITALS — SYSTOLIC BLOOD PRESSURE: 123 MMHG | DIASTOLIC BLOOD PRESSURE: 69 MMHG

## 2022-04-20 VITALS — DIASTOLIC BLOOD PRESSURE: 65 MMHG | SYSTOLIC BLOOD PRESSURE: 121 MMHG

## 2022-04-20 VITALS — DIASTOLIC BLOOD PRESSURE: 67 MMHG | SYSTOLIC BLOOD PRESSURE: 120 MMHG

## 2022-04-20 VITALS — SYSTOLIC BLOOD PRESSURE: 86 MMHG | DIASTOLIC BLOOD PRESSURE: 51 MMHG

## 2022-04-20 VITALS — DIASTOLIC BLOOD PRESSURE: 56 MMHG | SYSTOLIC BLOOD PRESSURE: 83 MMHG

## 2022-04-20 VITALS — DIASTOLIC BLOOD PRESSURE: 71 MMHG | SYSTOLIC BLOOD PRESSURE: 134 MMHG

## 2022-04-20 VITALS — DIASTOLIC BLOOD PRESSURE: 52 MMHG | SYSTOLIC BLOOD PRESSURE: 90 MMHG

## 2022-04-20 VITALS — DIASTOLIC BLOOD PRESSURE: 66 MMHG | SYSTOLIC BLOOD PRESSURE: 99 MMHG

## 2022-04-20 VITALS — SYSTOLIC BLOOD PRESSURE: 92 MMHG | DIASTOLIC BLOOD PRESSURE: 51 MMHG

## 2022-04-20 VITALS — SYSTOLIC BLOOD PRESSURE: 106 MMHG | DIASTOLIC BLOOD PRESSURE: 65 MMHG

## 2022-04-20 VITALS — DIASTOLIC BLOOD PRESSURE: 68 MMHG | SYSTOLIC BLOOD PRESSURE: 112 MMHG

## 2022-04-20 VITALS — DIASTOLIC BLOOD PRESSURE: 62 MMHG | SYSTOLIC BLOOD PRESSURE: 101 MMHG

## 2022-04-20 VITALS — DIASTOLIC BLOOD PRESSURE: 69 MMHG | SYSTOLIC BLOOD PRESSURE: 125 MMHG

## 2022-04-20 VITALS — SYSTOLIC BLOOD PRESSURE: 123 MMHG | DIASTOLIC BLOOD PRESSURE: 73 MMHG

## 2022-04-20 VITALS — DIASTOLIC BLOOD PRESSURE: 70 MMHG | SYSTOLIC BLOOD PRESSURE: 123 MMHG

## 2022-04-20 VITALS — DIASTOLIC BLOOD PRESSURE: 81 MMHG | SYSTOLIC BLOOD PRESSURE: 119 MMHG

## 2022-04-20 VITALS — DIASTOLIC BLOOD PRESSURE: 76 MMHG | SYSTOLIC BLOOD PRESSURE: 133 MMHG

## 2022-04-20 VITALS — DIASTOLIC BLOOD PRESSURE: 66 MMHG | SYSTOLIC BLOOD PRESSURE: 104 MMHG

## 2022-04-20 VITALS — SYSTOLIC BLOOD PRESSURE: 120 MMHG | DIASTOLIC BLOOD PRESSURE: 67 MMHG

## 2022-04-20 VITALS — SYSTOLIC BLOOD PRESSURE: 103 MMHG | DIASTOLIC BLOOD PRESSURE: 65 MMHG

## 2022-04-20 VITALS — SYSTOLIC BLOOD PRESSURE: 122 MMHG | DIASTOLIC BLOOD PRESSURE: 69 MMHG

## 2022-04-20 VITALS — DIASTOLIC BLOOD PRESSURE: 68 MMHG | SYSTOLIC BLOOD PRESSURE: 113 MMHG

## 2022-04-20 VITALS — SYSTOLIC BLOOD PRESSURE: 87 MMHG | DIASTOLIC BLOOD PRESSURE: 55 MMHG

## 2022-04-20 VITALS — SYSTOLIC BLOOD PRESSURE: 92 MMHG | DIASTOLIC BLOOD PRESSURE: 55 MMHG

## 2022-04-20 VITALS — SYSTOLIC BLOOD PRESSURE: 92 MMHG | DIASTOLIC BLOOD PRESSURE: 58 MMHG

## 2022-04-20 VITALS — SYSTOLIC BLOOD PRESSURE: 135 MMHG | DIASTOLIC BLOOD PRESSURE: 72 MMHG

## 2022-04-20 VITALS — DIASTOLIC BLOOD PRESSURE: 64 MMHG | SYSTOLIC BLOOD PRESSURE: 91 MMHG

## 2022-04-20 VITALS — DIASTOLIC BLOOD PRESSURE: 57 MMHG | SYSTOLIC BLOOD PRESSURE: 92 MMHG

## 2022-04-20 VITALS — SYSTOLIC BLOOD PRESSURE: 94 MMHG | DIASTOLIC BLOOD PRESSURE: 52 MMHG

## 2022-04-20 VITALS — SYSTOLIC BLOOD PRESSURE: 132 MMHG | DIASTOLIC BLOOD PRESSURE: 80 MMHG

## 2022-04-20 VITALS — DIASTOLIC BLOOD PRESSURE: 81 MMHG | SYSTOLIC BLOOD PRESSURE: 132 MMHG

## 2022-04-20 VITALS — SYSTOLIC BLOOD PRESSURE: 113 MMHG | DIASTOLIC BLOOD PRESSURE: 68 MMHG

## 2022-04-20 VITALS — SYSTOLIC BLOOD PRESSURE: 107 MMHG | DIASTOLIC BLOOD PRESSURE: 72 MMHG

## 2022-04-20 VITALS — SYSTOLIC BLOOD PRESSURE: 110 MMHG | DIASTOLIC BLOOD PRESSURE: 70 MMHG

## 2022-04-20 VITALS — DIASTOLIC BLOOD PRESSURE: 71 MMHG | SYSTOLIC BLOOD PRESSURE: 117 MMHG

## 2022-04-20 VITALS — DIASTOLIC BLOOD PRESSURE: 76 MMHG | SYSTOLIC BLOOD PRESSURE: 122 MMHG

## 2022-04-20 VITALS — DIASTOLIC BLOOD PRESSURE: 68 MMHG | SYSTOLIC BLOOD PRESSURE: 117 MMHG

## 2022-04-20 VITALS — DIASTOLIC BLOOD PRESSURE: 75 MMHG | SYSTOLIC BLOOD PRESSURE: 132 MMHG

## 2022-04-20 VITALS — SYSTOLIC BLOOD PRESSURE: 113 MMHG | DIASTOLIC BLOOD PRESSURE: 72 MMHG

## 2022-04-20 VITALS — DIASTOLIC BLOOD PRESSURE: 72 MMHG | SYSTOLIC BLOOD PRESSURE: 123 MMHG

## 2022-04-20 VITALS — SYSTOLIC BLOOD PRESSURE: 126 MMHG | DIASTOLIC BLOOD PRESSURE: 64 MMHG

## 2022-04-20 VITALS — SYSTOLIC BLOOD PRESSURE: 90 MMHG | DIASTOLIC BLOOD PRESSURE: 57 MMHG

## 2022-04-20 VITALS — DIASTOLIC BLOOD PRESSURE: 22 MMHG | SYSTOLIC BLOOD PRESSURE: 80 MMHG

## 2022-04-20 VITALS — DIASTOLIC BLOOD PRESSURE: 65 MMHG | SYSTOLIC BLOOD PRESSURE: 115 MMHG

## 2022-04-20 VITALS — DIASTOLIC BLOOD PRESSURE: 56 MMHG | SYSTOLIC BLOOD PRESSURE: 99 MMHG

## 2022-04-20 VITALS — SYSTOLIC BLOOD PRESSURE: 146 MMHG | DIASTOLIC BLOOD PRESSURE: 76 MMHG

## 2022-04-20 VITALS — SYSTOLIC BLOOD PRESSURE: 111 MMHG | DIASTOLIC BLOOD PRESSURE: 64 MMHG

## 2022-04-20 VITALS — SYSTOLIC BLOOD PRESSURE: 91 MMHG | DIASTOLIC BLOOD PRESSURE: 55 MMHG

## 2022-04-20 VITALS — DIASTOLIC BLOOD PRESSURE: 54 MMHG | SYSTOLIC BLOOD PRESSURE: 91 MMHG

## 2022-04-20 VITALS — DIASTOLIC BLOOD PRESSURE: 68 MMHG | SYSTOLIC BLOOD PRESSURE: 105 MMHG

## 2022-04-20 VITALS — DIASTOLIC BLOOD PRESSURE: 52 MMHG | SYSTOLIC BLOOD PRESSURE: 89 MMHG

## 2022-04-20 VITALS — SYSTOLIC BLOOD PRESSURE: 123 MMHG | DIASTOLIC BLOOD PRESSURE: 67 MMHG

## 2022-04-20 VITALS — SYSTOLIC BLOOD PRESSURE: 122 MMHG | DIASTOLIC BLOOD PRESSURE: 68 MMHG

## 2022-04-20 VITALS — DIASTOLIC BLOOD PRESSURE: 58 MMHG | SYSTOLIC BLOOD PRESSURE: 90 MMHG

## 2022-04-20 VITALS — SYSTOLIC BLOOD PRESSURE: 139 MMHG | DIASTOLIC BLOOD PRESSURE: 81 MMHG

## 2022-04-20 VITALS — DIASTOLIC BLOOD PRESSURE: 68 MMHG | SYSTOLIC BLOOD PRESSURE: 122 MMHG

## 2022-04-20 VITALS — SYSTOLIC BLOOD PRESSURE: 90 MMHG | DIASTOLIC BLOOD PRESSURE: 66 MMHG

## 2022-04-20 LAB
BASE EXCESS BLDA CALC-SCNC: -7 MMOL/L
BASOPHILS # BLD AUTO: 0.1 K/UL (ref 0–0.2)
BASOPHILS NFR BLD AUTO: 0.5 % (ref 0–2)
BUN SERPL-MCNC: 42 MG/DL (ref 7–18)
CALCIUM SERPL-MCNC: 7.2 MG/DL (ref 8.5–10.1)
CHLORIDE SERPL-SCNC: 97 MMOL/L (ref 98–107)
CO2 SERPL-SCNC: 19 MMOL/L (ref 21–32)
CREAT SERPL-MCNC: 4.6 MG/DL (ref 0.6–1.3)
DO-HGB MFR BLDA: 83 MMHG
EOSINOPHIL NFR BLD AUTO: 1.2 % (ref 0–6)
GLUCOSE SERPL-MCNC: 84 MG/DL (ref 74–106)
HCT VFR BLD AUTO: 35 % (ref 33–45)
HGB BLD-MCNC: 10.9 G/DL (ref 11.5–14.8)
INHALED O2 CONCENTRATION: 30 %
LYMPHOCYTES NFR BLD AUTO: 1.4 K/UL (ref 0.8–4.8)
LYMPHOCYTES NFR BLD AUTO: 5.8 % (ref 20–44)
MAGNESIUM SERPL-MCNC: 1.8 MG/DL (ref 1.8–2.4)
MCHC RBC AUTO-ENTMCNC: 31 G/DL (ref 31–36)
MCV RBC AUTO: 77 FL (ref 82–100)
MONOCYTES NFR BLD AUTO: 11.3 % (ref 2–12)
MONOCYTES NFR BLD AUTO: 2.7 K/UL (ref 0.1–1.3)
NEUTROPHILS # BLD AUTO: 19.4 K/UL (ref 1.8–8.9)
NEUTROPHILS NFR BLD AUTO: 81.2 % (ref 43–81)
PCO2 TEMP ADJ BLDA: 28.8 MMHG (ref 35–45)
PH TEMP ADJ BLDA: 7.38 [PH] (ref 7.35–7.45)
PHOSPHATE SERPL-MCNC: 4.8 MG/DL (ref 2.5–4.9)
PLATELET # BLD AUTO: 515 K/UL (ref 150–450)
PO2 TEMP ADJ BLDA: 97.1 MMHG (ref 75–100)
POTASSIUM SERPL-SCNC: 3.5 MMOL/L (ref 3.5–5.1)
RBC # BLD AUTO: 4.53 MIL/UL (ref 4–5.2)
SAO2 % BLDA: 96.8 % (ref 92–98.5)
SODIUM SERPL-SCNC: 132 MMOL/L (ref 136–145)
WBC NRBC COR # BLD AUTO: 23.9 K/UL (ref 4.3–11)

## 2022-04-20 RX ADMIN — HEPARIN SODIUM SCH UNITS: 5000 INJECTION INTRAVENOUS; SUBCUTANEOUS at 16:46

## 2022-04-20 RX ADMIN — PIPERACILLIN SODIUM AND TAZOBACTAM SODIUM SCH MLS/HR: .375; 3 INJECTION, POWDER, LYOPHILIZED, FOR SOLUTION INTRAVENOUS at 11:33

## 2022-04-20 RX ADMIN — Medication SCH EACH: at 11:30

## 2022-04-20 RX ADMIN — PIPERACILLIN SODIUM AND TAZOBACTAM SODIUM SCH MLS/HR: .375; 3 INJECTION, POWDER, LYOPHILIZED, FOR SOLUTION INTRAVENOUS at 00:03

## 2022-04-20 RX ADMIN — PROPOFOL PRN MLS/HR: 10 INJECTION, EMULSION INTRAVENOUS at 08:11

## 2022-04-20 RX ADMIN — DEXTROSE AND SODIUM CHLORIDE PRN MLS/HR: 5; 900 INJECTION, SOLUTION INTRAVENOUS at 03:49

## 2022-04-20 RX ADMIN — Medication SCH EACH: at 05:55

## 2022-04-20 RX ADMIN — SODIUM CHLORIDE SCH MG: 9 INJECTION, SOLUTION INTRAVENOUS at 08:52

## 2022-04-20 RX ADMIN — DEXTROSE AND SODIUM CHLORIDE PRN MLS/HR: 5; 900 INJECTION, SOLUTION INTRAVENOUS at 11:35

## 2022-04-20 RX ADMIN — PROPOFOL PRN MLS/HR: 10 INJECTION, EMULSION INTRAVENOUS at 03:48

## 2022-04-20 RX ADMIN — PROPOFOL PRN MLS/HR: 10 INJECTION, EMULSION INTRAVENOUS at 12:32

## 2022-04-20 RX ADMIN — Medication SCH EACH: at 00:05

## 2022-04-20 RX ADMIN — INSULIN GLARGINE SCH UNIT: 100 INJECTION, SOLUTION SUBCUTANEOUS at 22:01

## 2022-04-20 RX ADMIN — PROPOFOL PRN MLS/HR: 10 INJECTION, EMULSION INTRAVENOUS at 16:55

## 2022-04-20 RX ADMIN — HEPARIN SODIUM SCH UNITS: 5000 INJECTION INTRAVENOUS; SUBCUTANEOUS at 08:56

## 2022-04-20 RX ADMIN — PIPERACILLIN SODIUM AND TAZOBACTAM SODIUM SCH MLS/HR: .375; 3 INJECTION, POWDER, LYOPHILIZED, FOR SOLUTION INTRAVENOUS at 23:58

## 2022-04-20 RX ADMIN — PROPOFOL PRN MLS/HR: 10 INJECTION, EMULSION INTRAVENOUS at 21:13

## 2022-04-20 RX ADMIN — SODIUM CHLORIDE PRN MLS/HR: 9 INJECTION, SOLUTION INTRAVENOUS at 06:44

## 2022-04-20 RX ADMIN — Medication SCH EACH: at 18:23

## 2022-04-20 RX ADMIN — Medication SCH EACH: at 23:37

## 2022-04-20 RX ADMIN — PIPERACILLIN SODIUM AND TAZOBACTAM SODIUM SCH MLS/HR: .375; 3 INJECTION, POWDER, LYOPHILIZED, FOR SOLUTION INTRAVENOUS at 05:59

## 2022-04-20 RX ADMIN — PIPERACILLIN SODIUM AND TAZOBACTAM SODIUM SCH MLS/HR: .375; 3 INJECTION, POWDER, LYOPHILIZED, FOR SOLUTION INTRAVENOUS at 18:13

## 2022-04-20 RX ADMIN — DEXTROSE AND SODIUM CHLORIDE PRN MLS/HR: 5; 900 INJECTION, SOLUTION INTRAVENOUS at 21:12

## 2022-04-20 NOTE — NUR
NO SIGNIFICANT CHANGES NOTED AT THIS TIME; PT. COMFORTABLY ON BED;ENDORSED TO SIMONE-GRACIE FOR 
CONTINUITY OF CARE.

## 2022-04-20 NOTE — NUR
ICU NOTES

Received patient sedated and intubated to mechanical vent on AC modes.DX: Acte RESPIRATORY 
FAILURE,PNA,ACUTE RENAL FAILURE.Sedated on Diprivan gtt at 35 mcg.SR 60's on Levophed gtt at 
0.04 mcg for BP support and will titrate accordingly.NPO  with R ngt to LIS no output at 
this time.IVF infusing well.FC to gravity. No acute distress noted.Turned and repositioned.

## 2022-04-21 VITALS — DIASTOLIC BLOOD PRESSURE: 54 MMHG | SYSTOLIC BLOOD PRESSURE: 85 MMHG

## 2022-04-21 VITALS — SYSTOLIC BLOOD PRESSURE: 119 MMHG | DIASTOLIC BLOOD PRESSURE: 72 MMHG

## 2022-04-21 VITALS — DIASTOLIC BLOOD PRESSURE: 66 MMHG | SYSTOLIC BLOOD PRESSURE: 105 MMHG

## 2022-04-21 VITALS — SYSTOLIC BLOOD PRESSURE: 133 MMHG | DIASTOLIC BLOOD PRESSURE: 67 MMHG

## 2022-04-21 VITALS — SYSTOLIC BLOOD PRESSURE: 124 MMHG | DIASTOLIC BLOOD PRESSURE: 73 MMHG

## 2022-04-21 VITALS — DIASTOLIC BLOOD PRESSURE: 75 MMHG | SYSTOLIC BLOOD PRESSURE: 129 MMHG

## 2022-04-21 VITALS — DIASTOLIC BLOOD PRESSURE: 73 MMHG | SYSTOLIC BLOOD PRESSURE: 133 MMHG

## 2022-04-21 VITALS — DIASTOLIC BLOOD PRESSURE: 67 MMHG | SYSTOLIC BLOOD PRESSURE: 129 MMHG

## 2022-04-21 VITALS — DIASTOLIC BLOOD PRESSURE: 64 MMHG | SYSTOLIC BLOOD PRESSURE: 104 MMHG

## 2022-04-21 VITALS — SYSTOLIC BLOOD PRESSURE: 111 MMHG | DIASTOLIC BLOOD PRESSURE: 55 MMHG

## 2022-04-21 VITALS — DIASTOLIC BLOOD PRESSURE: 60 MMHG | SYSTOLIC BLOOD PRESSURE: 106 MMHG

## 2022-04-21 VITALS — SYSTOLIC BLOOD PRESSURE: 116 MMHG | DIASTOLIC BLOOD PRESSURE: 58 MMHG

## 2022-04-21 VITALS — SYSTOLIC BLOOD PRESSURE: 97 MMHG | DIASTOLIC BLOOD PRESSURE: 56 MMHG

## 2022-04-21 VITALS — SYSTOLIC BLOOD PRESSURE: 124 MMHG | DIASTOLIC BLOOD PRESSURE: 71 MMHG

## 2022-04-21 VITALS — SYSTOLIC BLOOD PRESSURE: 119 MMHG | DIASTOLIC BLOOD PRESSURE: 68 MMHG

## 2022-04-21 VITALS — SYSTOLIC BLOOD PRESSURE: 99 MMHG | DIASTOLIC BLOOD PRESSURE: 61 MMHG

## 2022-04-21 VITALS — DIASTOLIC BLOOD PRESSURE: 55 MMHG | SYSTOLIC BLOOD PRESSURE: 108 MMHG

## 2022-04-21 VITALS — SYSTOLIC BLOOD PRESSURE: 133 MMHG | DIASTOLIC BLOOD PRESSURE: 75 MMHG

## 2022-04-21 VITALS — SYSTOLIC BLOOD PRESSURE: 148 MMHG | DIASTOLIC BLOOD PRESSURE: 66 MMHG

## 2022-04-21 VITALS — SYSTOLIC BLOOD PRESSURE: 99 MMHG | DIASTOLIC BLOOD PRESSURE: 50 MMHG

## 2022-04-21 VITALS — DIASTOLIC BLOOD PRESSURE: 68 MMHG | SYSTOLIC BLOOD PRESSURE: 114 MMHG

## 2022-04-21 VITALS — DIASTOLIC BLOOD PRESSURE: 66 MMHG | SYSTOLIC BLOOD PRESSURE: 130 MMHG

## 2022-04-21 VITALS — SYSTOLIC BLOOD PRESSURE: 86 MMHG | DIASTOLIC BLOOD PRESSURE: 53 MMHG

## 2022-04-21 VITALS — DIASTOLIC BLOOD PRESSURE: 66 MMHG | SYSTOLIC BLOOD PRESSURE: 115 MMHG

## 2022-04-21 VITALS — DIASTOLIC BLOOD PRESSURE: 65 MMHG | SYSTOLIC BLOOD PRESSURE: 114 MMHG

## 2022-04-21 VITALS — DIASTOLIC BLOOD PRESSURE: 69 MMHG | SYSTOLIC BLOOD PRESSURE: 122 MMHG

## 2022-04-21 VITALS — SYSTOLIC BLOOD PRESSURE: 133 MMHG | DIASTOLIC BLOOD PRESSURE: 74 MMHG

## 2022-04-21 VITALS — SYSTOLIC BLOOD PRESSURE: 82 MMHG | DIASTOLIC BLOOD PRESSURE: 53 MMHG

## 2022-04-21 VITALS — DIASTOLIC BLOOD PRESSURE: 55 MMHG | SYSTOLIC BLOOD PRESSURE: 89 MMHG

## 2022-04-21 VITALS — DIASTOLIC BLOOD PRESSURE: 66 MMHG | SYSTOLIC BLOOD PRESSURE: 112 MMHG

## 2022-04-21 VITALS — DIASTOLIC BLOOD PRESSURE: 62 MMHG | SYSTOLIC BLOOD PRESSURE: 126 MMHG

## 2022-04-21 VITALS — DIASTOLIC BLOOD PRESSURE: 56 MMHG | SYSTOLIC BLOOD PRESSURE: 89 MMHG

## 2022-04-21 VITALS — DIASTOLIC BLOOD PRESSURE: 71 MMHG | SYSTOLIC BLOOD PRESSURE: 130 MMHG

## 2022-04-21 VITALS — SYSTOLIC BLOOD PRESSURE: 126 MMHG | DIASTOLIC BLOOD PRESSURE: 71 MMHG

## 2022-04-21 VITALS — DIASTOLIC BLOOD PRESSURE: 64 MMHG | SYSTOLIC BLOOD PRESSURE: 114 MMHG

## 2022-04-21 VITALS — DIASTOLIC BLOOD PRESSURE: 53 MMHG | SYSTOLIC BLOOD PRESSURE: 91 MMHG

## 2022-04-21 VITALS — SYSTOLIC BLOOD PRESSURE: 125 MMHG | DIASTOLIC BLOOD PRESSURE: 75 MMHG

## 2022-04-21 VITALS — SYSTOLIC BLOOD PRESSURE: 118 MMHG | DIASTOLIC BLOOD PRESSURE: 69 MMHG

## 2022-04-21 VITALS — DIASTOLIC BLOOD PRESSURE: 74 MMHG | SYSTOLIC BLOOD PRESSURE: 128 MMHG

## 2022-04-21 VITALS — DIASTOLIC BLOOD PRESSURE: 70 MMHG | SYSTOLIC BLOOD PRESSURE: 112 MMHG

## 2022-04-21 VITALS — SYSTOLIC BLOOD PRESSURE: 110 MMHG | DIASTOLIC BLOOD PRESSURE: 61 MMHG

## 2022-04-21 VITALS — SYSTOLIC BLOOD PRESSURE: 122 MMHG | DIASTOLIC BLOOD PRESSURE: 66 MMHG

## 2022-04-21 VITALS — DIASTOLIC BLOOD PRESSURE: 70 MMHG | SYSTOLIC BLOOD PRESSURE: 108 MMHG

## 2022-04-21 VITALS — DIASTOLIC BLOOD PRESSURE: 70 MMHG | SYSTOLIC BLOOD PRESSURE: 121 MMHG

## 2022-04-21 VITALS — DIASTOLIC BLOOD PRESSURE: 72 MMHG | SYSTOLIC BLOOD PRESSURE: 124 MMHG

## 2022-04-21 VITALS — DIASTOLIC BLOOD PRESSURE: 69 MMHG | SYSTOLIC BLOOD PRESSURE: 117 MMHG

## 2022-04-21 VITALS — SYSTOLIC BLOOD PRESSURE: 107 MMHG | DIASTOLIC BLOOD PRESSURE: 63 MMHG

## 2022-04-21 VITALS — SYSTOLIC BLOOD PRESSURE: 114 MMHG | DIASTOLIC BLOOD PRESSURE: 66 MMHG

## 2022-04-21 VITALS — DIASTOLIC BLOOD PRESSURE: 60 MMHG | SYSTOLIC BLOOD PRESSURE: 107 MMHG

## 2022-04-21 VITALS — SYSTOLIC BLOOD PRESSURE: 115 MMHG | DIASTOLIC BLOOD PRESSURE: 62 MMHG

## 2022-04-21 VITALS — DIASTOLIC BLOOD PRESSURE: 65 MMHG | SYSTOLIC BLOOD PRESSURE: 113 MMHG

## 2022-04-21 VITALS — SYSTOLIC BLOOD PRESSURE: 99 MMHG | DIASTOLIC BLOOD PRESSURE: 52 MMHG

## 2022-04-21 VITALS — SYSTOLIC BLOOD PRESSURE: 120 MMHG | DIASTOLIC BLOOD PRESSURE: 73 MMHG

## 2022-04-21 VITALS — DIASTOLIC BLOOD PRESSURE: 59 MMHG | SYSTOLIC BLOOD PRESSURE: 109 MMHG

## 2022-04-21 VITALS — DIASTOLIC BLOOD PRESSURE: 55 MMHG | SYSTOLIC BLOOD PRESSURE: 101 MMHG

## 2022-04-21 VITALS — DIASTOLIC BLOOD PRESSURE: 57 MMHG | SYSTOLIC BLOOD PRESSURE: 95 MMHG

## 2022-04-21 VITALS — SYSTOLIC BLOOD PRESSURE: 116 MMHG | DIASTOLIC BLOOD PRESSURE: 69 MMHG

## 2022-04-21 VITALS — DIASTOLIC BLOOD PRESSURE: 58 MMHG | SYSTOLIC BLOOD PRESSURE: 110 MMHG

## 2022-04-21 VITALS — DIASTOLIC BLOOD PRESSURE: 51 MMHG | SYSTOLIC BLOOD PRESSURE: 89 MMHG

## 2022-04-21 VITALS — SYSTOLIC BLOOD PRESSURE: 78 MMHG | DIASTOLIC BLOOD PRESSURE: 53 MMHG

## 2022-04-21 VITALS — DIASTOLIC BLOOD PRESSURE: 49 MMHG | SYSTOLIC BLOOD PRESSURE: 89 MMHG

## 2022-04-21 VITALS — DIASTOLIC BLOOD PRESSURE: 41 MMHG | SYSTOLIC BLOOD PRESSURE: 79 MMHG

## 2022-04-21 VITALS — SYSTOLIC BLOOD PRESSURE: 119 MMHG | DIASTOLIC BLOOD PRESSURE: 70 MMHG

## 2022-04-21 VITALS — SYSTOLIC BLOOD PRESSURE: 110 MMHG | DIASTOLIC BLOOD PRESSURE: 57 MMHG

## 2022-04-21 VITALS — SYSTOLIC BLOOD PRESSURE: 108 MMHG | DIASTOLIC BLOOD PRESSURE: 61 MMHG

## 2022-04-21 VITALS — SYSTOLIC BLOOD PRESSURE: 123 MMHG | DIASTOLIC BLOOD PRESSURE: 57 MMHG

## 2022-04-21 VITALS — SYSTOLIC BLOOD PRESSURE: 105 MMHG | DIASTOLIC BLOOD PRESSURE: 74 MMHG

## 2022-04-21 VITALS — SYSTOLIC BLOOD PRESSURE: 117 MMHG | DIASTOLIC BLOOD PRESSURE: 63 MMHG

## 2022-04-21 VITALS — SYSTOLIC BLOOD PRESSURE: 114 MMHG | DIASTOLIC BLOOD PRESSURE: 69 MMHG

## 2022-04-21 VITALS — SYSTOLIC BLOOD PRESSURE: 102 MMHG | DIASTOLIC BLOOD PRESSURE: 62 MMHG

## 2022-04-21 LAB
BASE EXCESS BLDA CALC-SCNC: -5.8 MMOL/L
BASOPHILS # BLD AUTO: 0 K/UL (ref 0–0.2)
BASOPHILS NFR BLD AUTO: 0.3 % (ref 0–2)
BUN SERPL-MCNC: 25 MG/DL (ref 7–18)
BUN SERPL-MCNC: 34 MG/DL (ref 7–18)
CALCIUM SERPL-MCNC: 7 MG/DL (ref 8.5–10.1)
CALCIUM SERPL-MCNC: 7.2 MG/DL (ref 8.5–10.1)
CHLORIDE SERPL-SCNC: 100 MMOL/L (ref 98–107)
CHLORIDE SERPL-SCNC: 99 MMOL/L (ref 98–107)
CO2 SERPL-SCNC: 21 MMOL/L (ref 21–32)
CO2 SERPL-SCNC: 24 MMOL/L (ref 21–32)
CREAT SERPL-MCNC: 3.4 MG/DL (ref 0.6–1.3)
CREAT SERPL-MCNC: 4 MG/DL (ref 0.6–1.3)
DO-HGB MFR BLDA: 69 MMHG
EOSINOPHIL NFR BLD AUTO: 2.4 % (ref 0–6)
GLUCOSE SERPL-MCNC: 150 MG/DL (ref 74–106)
GLUCOSE SERPL-MCNC: 202 MG/DL (ref 74–106)
HCT VFR BLD AUTO: 32 % (ref 33–45)
HGB BLD-MCNC: 10.1 G/DL (ref 11.5–14.8)
INHALED O2 CONCENTRATION: 30 %
INTRINSIC PEEP RESPIRATORY: 5 CM H2O
LYMPHOCYTES NFR BLD AUTO: 1.4 K/UL (ref 0.8–4.8)
LYMPHOCYTES NFR BLD AUTO: 7.6 % (ref 20–44)
MAGNESIUM SERPL-MCNC: 1.7 MG/DL (ref 1.8–2.4)
MCHC RBC AUTO-ENTMCNC: 31 G/DL (ref 31–36)
MCV RBC AUTO: 76 FL (ref 82–100)
MONOCYTES NFR BLD AUTO: 12.6 % (ref 2–12)
MONOCYTES NFR BLD AUTO: 2.3 K/UL (ref 0.1–1.3)
NEUTROPHILS # BLD AUTO: 14.1 K/UL (ref 1.8–8.9)
NEUTROPHILS NFR BLD AUTO: 77.1 % (ref 43–81)
PCO2 TEMP ADJ BLDA: 33.1 MMHG (ref 35–45)
PH TEMP ADJ BLDA: 7.37 [PH] (ref 7.35–7.45)
PHOSPHATE SERPL-MCNC: 4.4 MG/DL (ref 2.5–4.9)
PLATELET # BLD AUTO: 374 K/UL (ref 150–450)
PO2 TEMP ADJ BLDA: 106 MMHG (ref 75–100)
POTASSIUM SERPL-SCNC: 2.9 MMOL/L (ref 3.5–5.1)
POTASSIUM SERPL-SCNC: 3.8 MMOL/L (ref 3.5–5.1)
RBC # BLD AUTO: 4.26 MIL/UL (ref 4–5.2)
SAO2 % BLDA: 97.4 % (ref 92–98.5)
SODIUM SERPL-SCNC: 135 MMOL/L (ref 136–145)
SODIUM SERPL-SCNC: 135 MMOL/L (ref 136–145)
VENTILATION MODE VENT: (no result)
WBC NRBC COR # BLD AUTO: 18.3 K/UL (ref 4.3–11)

## 2022-04-21 RX ADMIN — MAGNESIUM SULFATE IN DEXTROSE SCH MLS/HR: 10 INJECTION, SOLUTION INTRAVENOUS at 09:30

## 2022-04-21 RX ADMIN — PROPOFOL PRN MLS/HR: 10 INJECTION, EMULSION INTRAVENOUS at 01:51

## 2022-04-21 RX ADMIN — POTASSIUM CHLORIDE SCH MLS/HR: 200 INJECTION, SOLUTION INTRAVENOUS at 12:17

## 2022-04-21 RX ADMIN — POTASSIUM CHLORIDE SCH MLS/HR: 200 INJECTION, SOLUTION INTRAVENOUS at 11:02

## 2022-04-21 RX ADMIN — Medication SCH EACH: at 05:33

## 2022-04-21 RX ADMIN — DEXTROSE AND SODIUM CHLORIDE PRN MLS/HR: 5; 900 INJECTION, SOLUTION INTRAVENOUS at 05:54

## 2022-04-21 RX ADMIN — Medication SCH EACH: at 11:52

## 2022-04-21 RX ADMIN — MAGNESIUM SULFATE IN DEXTROSE SCH MLS/HR: 10 INJECTION, SOLUTION INTRAVENOUS at 10:12

## 2022-04-21 RX ADMIN — INSULIN GLARGINE SCH UNIT: 100 INJECTION, SOLUTION SUBCUTANEOUS at 23:00

## 2022-04-21 RX ADMIN — DEXTROSE MONOHYDRATE SCH MLS/HR: 50 INJECTION, SOLUTION INTRAVENOUS at 09:00

## 2022-04-21 RX ADMIN — HEPARIN SODIUM SCH UNITS: 5000 INJECTION INTRAVENOUS; SUBCUTANEOUS at 17:47

## 2022-04-21 RX ADMIN — SODIUM CHLORIDE SCH MG: 9 INJECTION, SOLUTION INTRAVENOUS at 08:12

## 2022-04-21 RX ADMIN — HEPARIN SODIUM SCH UNITS: 5000 INJECTION INTRAVENOUS; SUBCUTANEOUS at 08:13

## 2022-04-21 RX ADMIN — SODIUM CHLORIDE PRN MLS/HR: 9 INJECTION, SOLUTION INTRAVENOUS at 00:26

## 2022-04-21 RX ADMIN — PIPERACILLIN SODIUM AND TAZOBACTAM SODIUM SCH MLS/HR: .375; 3 INJECTION, POWDER, LYOPHILIZED, FOR SOLUTION INTRAVENOUS at 11:04

## 2022-04-21 RX ADMIN — DEXTROSE AND SODIUM CHLORIDE PRN MLS/HR: 5; 900 INJECTION, SOLUTION INTRAVENOUS at 14:49

## 2022-04-21 RX ADMIN — DEXTROSE AND SODIUM CHLORIDE PRN MLS/HR: 5; 900 INJECTION, SOLUTION INTRAVENOUS at 23:36

## 2022-04-21 RX ADMIN — PIPERACILLIN SODIUM AND TAZOBACTAM SODIUM SCH MLS/HR: .375; 3 INJECTION, POWDER, LYOPHILIZED, FOR SOLUTION INTRAVENOUS at 05:33

## 2022-04-21 RX ADMIN — POTASSIUM CHLORIDE SCH MLS/HR: 200 INJECTION, SOLUTION INTRAVENOUS at 12:59

## 2022-04-21 RX ADMIN — PROPOFOL PRN MLS/HR: 10 INJECTION, EMULSION INTRAVENOUS at 06:36

## 2022-04-21 RX ADMIN — PIPERACILLIN SODIUM AND TAZOBACTAM SODIUM SCH MLS/HR: .375; 3 INJECTION, POWDER, LYOPHILIZED, FOR SOLUTION INTRAVENOUS at 17:47

## 2022-04-21 RX ADMIN — Medication SCH EACH: at 17:52

## 2022-04-21 NOTE — NUR
RN ICU

Dr Prather at bedside assessing pt and updated on pt status.  md aware that pt k 2.9 and mg 
1.7, new verbal orders entered per md and verified with charge nurse Jolly BOWMAN.  sedation 
vacation order today per md. no other orders at this time.

## 2022-04-21 NOTE — NUR
RN ICU

Bedside report given to [facility] nurse Enmanuel BOWMAN.  Pt awake, alert, on 3 L n/c tolerating well and 
resting comfortable in bed.  all lines traced.  all drips verified.  pt clean and dry.  
safety measures in place.  no signs of acute distress at this time.

## 2022-04-21 NOTE — NUR
RN ICU



Bedside report taken from noc nurse Ivonne BOWMAN.  pt sedated and intubated.  pt perrla.  pt 
does not open eyes or follow commands.  pt w/d bue and ble to painful stimuli.  pt has ngt 
to low intermittent suction.  pt NSR on monitor, sbp wnl.  pt lung sounds diminished.  
abdomen soft rounded , bowel sounds present.  pt has briggs intact and draining clear yellow 
urine.  skin check done, wounds assessed. all lines traced all drips verified.  safety 
meaures in place.  will continue to monitor.

## 2022-04-21 NOTE — NUR
RN ICU

Dr Prather at bedside assessing pt and updated on pt status.  Md aware that pt off sedation, 
ok to wean, Kaleigh RUELAS aware.  weaning pending.

## 2022-04-21 NOTE — NUR
RN NOTES

RECEIVED CARE OF PATIENT FROM AM NURSE WHILE PATIENT IN BED, A/O X4, ABLE TO MAKE NEEDS 
KNOWN, PATIENT IN NO DISCOMFORT OR PAIN AT THIS TIME. PATIENT ON ROOM AIR, O2 SAT 96%, 
BREATHING EVEN AND UNLABORED. PATIENT ON TELE MONITOR SHOWING NSR WITH HR OF 71, NO DISTRESS 
NOTED. RICHMOND CATH PATENT, DARNING YELLOW URINE. NGT NOTED, PATENT AND POSITIVE PLACEMENT 
CONFIRMED BY AUSCULTATION, WITH SUCTION AS ORDERED. SAFETY MEASURES IMPLEMENTED PER HOSPITAL 
PROTOCOLS. WILL CONTINUE TO MONITOR PATIENT.

## 2022-04-21 NOTE — NUR
RN ICU

ABG completed, pt tolerating weaning.  Dr Prather at bedside.  Pt extubated per MD order.  pt 
awake, alert and oriented and talking, follows commands.  pt placed on 3 L n/c.  pt 
tolerating well.  delio wrist restraints removed. vitals stable.  will continue to monitor.

## 2022-04-21 NOTE — NUR
RN ICU



Pt had BM x1 bathed and cleaned.  skin check done with Echo BOWMAN, no new wounds noted.  
Propofol off, sedation vacation started.  safety meaures in place.  will continue to 
monitor.

## 2022-04-21 NOTE — NUR
ICU NOTES



Patient resting in no acute distress.Vital signs remains stable..SR.Tolerating vent 
settings.Diprivan 

gtt infusing at 35 mcg,Levophed infusing at 0.04 mcg.AM care done.Blood sugar monitored Q 
6Hhrs.

No coverage ordered.will endorse to day shift for further care and management.

## 2022-04-21 NOTE — NUR
RN ICU



Spoke to Gina from pharmacy, informed that vanco trough 21, hold 9 am dose of vancomycin 
at this time per pharmacy.  charge nurse Jolly krishna.

## 2022-04-22 VITALS — SYSTOLIC BLOOD PRESSURE: 100 MMHG | DIASTOLIC BLOOD PRESSURE: 56 MMHG

## 2022-04-22 VITALS — DIASTOLIC BLOOD PRESSURE: 63 MMHG | SYSTOLIC BLOOD PRESSURE: 114 MMHG

## 2022-04-22 VITALS — SYSTOLIC BLOOD PRESSURE: 96 MMHG | DIASTOLIC BLOOD PRESSURE: 62 MMHG

## 2022-04-22 VITALS — DIASTOLIC BLOOD PRESSURE: 67 MMHG | SYSTOLIC BLOOD PRESSURE: 92 MMHG

## 2022-04-22 VITALS — DIASTOLIC BLOOD PRESSURE: 62 MMHG | SYSTOLIC BLOOD PRESSURE: 109 MMHG

## 2022-04-22 VITALS — DIASTOLIC BLOOD PRESSURE: 62 MMHG | SYSTOLIC BLOOD PRESSURE: 97 MMHG

## 2022-04-22 VITALS — DIASTOLIC BLOOD PRESSURE: 64 MMHG | SYSTOLIC BLOOD PRESSURE: 106 MMHG

## 2022-04-22 VITALS — SYSTOLIC BLOOD PRESSURE: 116 MMHG | DIASTOLIC BLOOD PRESSURE: 93 MMHG

## 2022-04-22 VITALS — SYSTOLIC BLOOD PRESSURE: 104 MMHG | DIASTOLIC BLOOD PRESSURE: 57 MMHG

## 2022-04-22 VITALS — DIASTOLIC BLOOD PRESSURE: 61 MMHG | SYSTOLIC BLOOD PRESSURE: 95 MMHG

## 2022-04-22 VITALS — DIASTOLIC BLOOD PRESSURE: 65 MMHG | SYSTOLIC BLOOD PRESSURE: 103 MMHG

## 2022-04-22 VITALS — SYSTOLIC BLOOD PRESSURE: 92 MMHG | DIASTOLIC BLOOD PRESSURE: 56 MMHG

## 2022-04-22 VITALS — SYSTOLIC BLOOD PRESSURE: 101 MMHG | DIASTOLIC BLOOD PRESSURE: 62 MMHG

## 2022-04-22 VITALS — DIASTOLIC BLOOD PRESSURE: 67 MMHG | SYSTOLIC BLOOD PRESSURE: 103 MMHG

## 2022-04-22 VITALS — DIASTOLIC BLOOD PRESSURE: 63 MMHG | SYSTOLIC BLOOD PRESSURE: 106 MMHG

## 2022-04-22 VITALS — DIASTOLIC BLOOD PRESSURE: 58 MMHG | SYSTOLIC BLOOD PRESSURE: 94 MMHG

## 2022-04-22 VITALS — DIASTOLIC BLOOD PRESSURE: 67 MMHG | SYSTOLIC BLOOD PRESSURE: 101 MMHG

## 2022-04-22 VITALS — DIASTOLIC BLOOD PRESSURE: 70 MMHG | SYSTOLIC BLOOD PRESSURE: 95 MMHG

## 2022-04-22 VITALS — SYSTOLIC BLOOD PRESSURE: 103 MMHG | DIASTOLIC BLOOD PRESSURE: 50 MMHG

## 2022-04-22 VITALS — DIASTOLIC BLOOD PRESSURE: 67 MMHG | SYSTOLIC BLOOD PRESSURE: 112 MMHG

## 2022-04-22 VITALS — DIASTOLIC BLOOD PRESSURE: 63 MMHG | SYSTOLIC BLOOD PRESSURE: 117 MMHG

## 2022-04-22 VITALS — DIASTOLIC BLOOD PRESSURE: 58 MMHG | SYSTOLIC BLOOD PRESSURE: 99 MMHG

## 2022-04-22 VITALS — SYSTOLIC BLOOD PRESSURE: 120 MMHG | DIASTOLIC BLOOD PRESSURE: 59 MMHG

## 2022-04-22 VITALS — DIASTOLIC BLOOD PRESSURE: 85 MMHG | SYSTOLIC BLOOD PRESSURE: 156 MMHG

## 2022-04-22 VITALS — SYSTOLIC BLOOD PRESSURE: 102 MMHG | DIASTOLIC BLOOD PRESSURE: 59 MMHG

## 2022-04-22 VITALS — SYSTOLIC BLOOD PRESSURE: 100 MMHG | DIASTOLIC BLOOD PRESSURE: 65 MMHG

## 2022-04-22 VITALS — SYSTOLIC BLOOD PRESSURE: 98 MMHG | DIASTOLIC BLOOD PRESSURE: 62 MMHG

## 2022-04-22 VITALS — SYSTOLIC BLOOD PRESSURE: 103 MMHG | DIASTOLIC BLOOD PRESSURE: 57 MMHG

## 2022-04-22 VITALS — DIASTOLIC BLOOD PRESSURE: 69 MMHG | SYSTOLIC BLOOD PRESSURE: 92 MMHG

## 2022-04-22 VITALS — SYSTOLIC BLOOD PRESSURE: 101 MMHG | DIASTOLIC BLOOD PRESSURE: 64 MMHG

## 2022-04-22 VITALS — SYSTOLIC BLOOD PRESSURE: 109 MMHG | DIASTOLIC BLOOD PRESSURE: 65 MMHG

## 2022-04-22 VITALS — SYSTOLIC BLOOD PRESSURE: 115 MMHG | DIASTOLIC BLOOD PRESSURE: 67 MMHG

## 2022-04-22 VITALS — DIASTOLIC BLOOD PRESSURE: 65 MMHG | SYSTOLIC BLOOD PRESSURE: 100 MMHG

## 2022-04-22 VITALS — SYSTOLIC BLOOD PRESSURE: 108 MMHG | DIASTOLIC BLOOD PRESSURE: 62 MMHG

## 2022-04-22 VITALS — DIASTOLIC BLOOD PRESSURE: 62 MMHG | SYSTOLIC BLOOD PRESSURE: 106 MMHG

## 2022-04-22 VITALS — SYSTOLIC BLOOD PRESSURE: 107 MMHG | DIASTOLIC BLOOD PRESSURE: 80 MMHG

## 2022-04-22 VITALS — DIASTOLIC BLOOD PRESSURE: 61 MMHG | SYSTOLIC BLOOD PRESSURE: 107 MMHG

## 2022-04-22 VITALS — SYSTOLIC BLOOD PRESSURE: 99 MMHG | DIASTOLIC BLOOD PRESSURE: 57 MMHG

## 2022-04-22 VITALS — SYSTOLIC BLOOD PRESSURE: 105 MMHG | DIASTOLIC BLOOD PRESSURE: 59 MMHG

## 2022-04-22 VITALS — SYSTOLIC BLOOD PRESSURE: 104 MMHG | DIASTOLIC BLOOD PRESSURE: 55 MMHG

## 2022-04-22 VITALS — SYSTOLIC BLOOD PRESSURE: 103 MMHG | DIASTOLIC BLOOD PRESSURE: 62 MMHG

## 2022-04-22 VITALS — SYSTOLIC BLOOD PRESSURE: 120 MMHG | DIASTOLIC BLOOD PRESSURE: 58 MMHG

## 2022-04-22 VITALS — DIASTOLIC BLOOD PRESSURE: 59 MMHG | SYSTOLIC BLOOD PRESSURE: 87 MMHG

## 2022-04-22 VITALS — DIASTOLIC BLOOD PRESSURE: 66 MMHG | SYSTOLIC BLOOD PRESSURE: 114 MMHG

## 2022-04-22 VITALS — DIASTOLIC BLOOD PRESSURE: 62 MMHG | SYSTOLIC BLOOD PRESSURE: 101 MMHG

## 2022-04-22 VITALS — DIASTOLIC BLOOD PRESSURE: 58 MMHG | SYSTOLIC BLOOD PRESSURE: 98 MMHG

## 2022-04-22 VITALS — DIASTOLIC BLOOD PRESSURE: 61 MMHG | SYSTOLIC BLOOD PRESSURE: 96 MMHG

## 2022-04-22 VITALS — DIASTOLIC BLOOD PRESSURE: 59 MMHG | SYSTOLIC BLOOD PRESSURE: 116 MMHG

## 2022-04-22 VITALS — SYSTOLIC BLOOD PRESSURE: 127 MMHG | DIASTOLIC BLOOD PRESSURE: 74 MMHG

## 2022-04-22 VITALS — SYSTOLIC BLOOD PRESSURE: 106 MMHG | DIASTOLIC BLOOD PRESSURE: 56 MMHG

## 2022-04-22 VITALS — SYSTOLIC BLOOD PRESSURE: 85 MMHG | DIASTOLIC BLOOD PRESSURE: 49 MMHG

## 2022-04-22 VITALS — SYSTOLIC BLOOD PRESSURE: 108 MMHG | DIASTOLIC BLOOD PRESSURE: 70 MMHG

## 2022-04-22 VITALS — DIASTOLIC BLOOD PRESSURE: 61 MMHG | SYSTOLIC BLOOD PRESSURE: 99 MMHG

## 2022-04-22 VITALS — SYSTOLIC BLOOD PRESSURE: 100 MMHG | DIASTOLIC BLOOD PRESSURE: 62 MMHG

## 2022-04-22 VITALS — DIASTOLIC BLOOD PRESSURE: 64 MMHG | SYSTOLIC BLOOD PRESSURE: 95 MMHG

## 2022-04-22 VITALS — DIASTOLIC BLOOD PRESSURE: 62 MMHG | SYSTOLIC BLOOD PRESSURE: 135 MMHG

## 2022-04-22 VITALS — DIASTOLIC BLOOD PRESSURE: 53 MMHG | SYSTOLIC BLOOD PRESSURE: 116 MMHG

## 2022-04-22 VITALS — DIASTOLIC BLOOD PRESSURE: 60 MMHG | SYSTOLIC BLOOD PRESSURE: 101 MMHG

## 2022-04-22 VITALS — DIASTOLIC BLOOD PRESSURE: 61 MMHG | SYSTOLIC BLOOD PRESSURE: 104 MMHG

## 2022-04-22 VITALS — DIASTOLIC BLOOD PRESSURE: 61 MMHG | SYSTOLIC BLOOD PRESSURE: 100 MMHG

## 2022-04-22 VITALS — DIASTOLIC BLOOD PRESSURE: 63 MMHG | SYSTOLIC BLOOD PRESSURE: 100 MMHG

## 2022-04-22 VITALS — SYSTOLIC BLOOD PRESSURE: 111 MMHG | DIASTOLIC BLOOD PRESSURE: 61 MMHG

## 2022-04-22 VITALS — DIASTOLIC BLOOD PRESSURE: 58 MMHG | SYSTOLIC BLOOD PRESSURE: 103 MMHG

## 2022-04-22 VITALS — SYSTOLIC BLOOD PRESSURE: 108 MMHG | DIASTOLIC BLOOD PRESSURE: 60 MMHG

## 2022-04-22 VITALS — DIASTOLIC BLOOD PRESSURE: 63 MMHG | SYSTOLIC BLOOD PRESSURE: 110 MMHG

## 2022-04-22 VITALS — SYSTOLIC BLOOD PRESSURE: 106 MMHG | DIASTOLIC BLOOD PRESSURE: 64 MMHG

## 2022-04-22 VITALS — SYSTOLIC BLOOD PRESSURE: 102 MMHG | DIASTOLIC BLOOD PRESSURE: 60 MMHG

## 2022-04-22 VITALS — DIASTOLIC BLOOD PRESSURE: 46 MMHG | SYSTOLIC BLOOD PRESSURE: 78 MMHG

## 2022-04-22 VITALS — DIASTOLIC BLOOD PRESSURE: 49 MMHG | SYSTOLIC BLOOD PRESSURE: 83 MMHG

## 2022-04-22 VITALS — DIASTOLIC BLOOD PRESSURE: 55 MMHG | SYSTOLIC BLOOD PRESSURE: 94 MMHG

## 2022-04-22 VITALS — SYSTOLIC BLOOD PRESSURE: 93 MMHG | DIASTOLIC BLOOD PRESSURE: 64 MMHG

## 2022-04-22 VITALS — DIASTOLIC BLOOD PRESSURE: 68 MMHG | SYSTOLIC BLOOD PRESSURE: 113 MMHG

## 2022-04-22 VITALS — DIASTOLIC BLOOD PRESSURE: 57 MMHG | SYSTOLIC BLOOD PRESSURE: 103 MMHG

## 2022-04-22 VITALS — SYSTOLIC BLOOD PRESSURE: 92 MMHG | DIASTOLIC BLOOD PRESSURE: 58 MMHG

## 2022-04-22 VITALS — SYSTOLIC BLOOD PRESSURE: 97 MMHG | DIASTOLIC BLOOD PRESSURE: 63 MMHG

## 2022-04-22 VITALS — SYSTOLIC BLOOD PRESSURE: 92 MMHG | DIASTOLIC BLOOD PRESSURE: 60 MMHG

## 2022-04-22 VITALS — SYSTOLIC BLOOD PRESSURE: 112 MMHG | DIASTOLIC BLOOD PRESSURE: 71 MMHG

## 2022-04-22 VITALS — DIASTOLIC BLOOD PRESSURE: 53 MMHG | SYSTOLIC BLOOD PRESSURE: 95 MMHG

## 2022-04-22 VITALS — DIASTOLIC BLOOD PRESSURE: 60 MMHG | SYSTOLIC BLOOD PRESSURE: 111 MMHG

## 2022-04-22 VITALS — DIASTOLIC BLOOD PRESSURE: 57 MMHG | SYSTOLIC BLOOD PRESSURE: 95 MMHG

## 2022-04-22 VITALS — DIASTOLIC BLOOD PRESSURE: 55 MMHG | SYSTOLIC BLOOD PRESSURE: 96 MMHG

## 2022-04-22 VITALS — SYSTOLIC BLOOD PRESSURE: 104 MMHG | DIASTOLIC BLOOD PRESSURE: 63 MMHG

## 2022-04-22 VITALS — DIASTOLIC BLOOD PRESSURE: 56 MMHG | SYSTOLIC BLOOD PRESSURE: 94 MMHG

## 2022-04-22 VITALS — SYSTOLIC BLOOD PRESSURE: 129 MMHG | DIASTOLIC BLOOD PRESSURE: 60 MMHG

## 2022-04-22 VITALS — SYSTOLIC BLOOD PRESSURE: 110 MMHG | DIASTOLIC BLOOD PRESSURE: 50 MMHG

## 2022-04-22 VITALS — DIASTOLIC BLOOD PRESSURE: 59 MMHG | SYSTOLIC BLOOD PRESSURE: 102 MMHG

## 2022-04-22 VITALS — SYSTOLIC BLOOD PRESSURE: 92 MMHG | DIASTOLIC BLOOD PRESSURE: 61 MMHG

## 2022-04-22 VITALS — SYSTOLIC BLOOD PRESSURE: 93 MMHG | DIASTOLIC BLOOD PRESSURE: 61 MMHG

## 2022-04-22 VITALS — DIASTOLIC BLOOD PRESSURE: 62 MMHG | SYSTOLIC BLOOD PRESSURE: 103 MMHG

## 2022-04-22 VITALS — DIASTOLIC BLOOD PRESSURE: 56 MMHG | SYSTOLIC BLOOD PRESSURE: 92 MMHG

## 2022-04-22 VITALS — DIASTOLIC BLOOD PRESSURE: 60 MMHG | SYSTOLIC BLOOD PRESSURE: 100 MMHG

## 2022-04-22 VITALS — DIASTOLIC BLOOD PRESSURE: 74 MMHG | SYSTOLIC BLOOD PRESSURE: 141 MMHG

## 2022-04-22 VITALS — DIASTOLIC BLOOD PRESSURE: 60 MMHG | SYSTOLIC BLOOD PRESSURE: 106 MMHG

## 2022-04-22 LAB
BASOPHILS # BLD AUTO: 0 K/UL (ref 0–0.2)
BASOPHILS NFR BLD AUTO: 0.2 % (ref 0–2)
BUN SERPL-MCNC: 27 MG/DL (ref 7–18)
CALCIUM SERPL-MCNC: 7.1 MG/DL (ref 8.5–10.1)
CHLORIDE SERPL-SCNC: 103 MMOL/L (ref 98–107)
CO2 SERPL-SCNC: 27 MMOL/L (ref 21–32)
CREAT SERPL-MCNC: 3.6 MG/DL (ref 0.6–1.3)
EOSINOPHIL NFR BLD AUTO: 2.3 % (ref 0–6)
GLUCOSE SERPL-MCNC: 146 MG/DL (ref 74–106)
HCT VFR BLD AUTO: 32 % (ref 33–45)
HGB BLD-MCNC: 9.8 G/DL (ref 11.5–14.8)
LYMPHOCYTES NFR BLD AUTO: 0.7 K/UL (ref 0.8–4.8)
LYMPHOCYTES NFR BLD AUTO: 4.3 % (ref 20–44)
MAGNESIUM SERPL-MCNC: 2.1 MG/DL (ref 1.8–2.4)
MCHC RBC AUTO-ENTMCNC: 31 G/DL (ref 31–36)
MCV RBC AUTO: 77 FL (ref 82–100)
MONOCYTES NFR BLD AUTO: 1.8 K/UL (ref 0.1–1.3)
MONOCYTES NFR BLD AUTO: 11.8 % (ref 2–12)
NEUTROPHILS # BLD AUTO: 12.6 K/UL (ref 1.8–8.9)
NEUTROPHILS NFR BLD AUTO: 81.4 % (ref 43–81)
PHOSPHATE SERPL-MCNC: 6.1 MG/DL (ref 2.5–4.9)
PLATELET # BLD AUTO: 328 K/UL (ref 150–450)
POTASSIUM SERPL-SCNC: 3.3 MMOL/L (ref 3.5–5.1)
RBC # BLD AUTO: 4.08 MIL/UL (ref 4–5.2)
SODIUM SERPL-SCNC: 137 MMOL/L (ref 136–145)
WBC NRBC COR # BLD AUTO: 15.5 K/UL (ref 4.3–11)

## 2022-04-22 RX ADMIN — POTASSIUM CHLORIDE SCH MLS/HR: 200 INJECTION, SOLUTION INTRAVENOUS at 09:36

## 2022-04-22 RX ADMIN — PIPERACILLIN SODIUM AND TAZOBACTAM SODIUM SCH MLS/HR: .375; 3 INJECTION, POWDER, LYOPHILIZED, FOR SOLUTION INTRAVENOUS at 00:07

## 2022-04-22 RX ADMIN — PIPERACILLIN SODIUM AND TAZOBACTAM SODIUM SCH MLS/HR: .375; 3 INJECTION, POWDER, LYOPHILIZED, FOR SOLUTION INTRAVENOUS at 11:57

## 2022-04-22 RX ADMIN — DEXTROSE AND SODIUM CHLORIDE PRN MLS/HR: 5; 900 INJECTION, SOLUTION INTRAVENOUS at 09:43

## 2022-04-22 RX ADMIN — Medication SCH EACH: at 06:13

## 2022-04-22 RX ADMIN — INSULIN HUMAN PRN UNITS: 100 INJECTION, SOLUTION PARENTERAL at 12:14

## 2022-04-22 RX ADMIN — Medication SCH EACH: at 12:14

## 2022-04-22 RX ADMIN — Medication SCH EACH: at 17:22

## 2022-04-22 RX ADMIN — POTASSIUM CHLORIDE SCH MLS/HR: 200 INJECTION, SOLUTION INTRAVENOUS at 10:49

## 2022-04-22 RX ADMIN — INSULIN GLARGINE SCH UNIT: 100 INJECTION, SOLUTION SUBCUTANEOUS at 22:53

## 2022-04-22 RX ADMIN — POTASSIUM CHLORIDE SCH MLS/HR: 200 INJECTION, SOLUTION INTRAVENOUS at 08:07

## 2022-04-22 RX ADMIN — HEPARIN SODIUM SCH UNITS: 5000 INJECTION INTRAVENOUS; SUBCUTANEOUS at 16:58

## 2022-04-22 RX ADMIN — INSULIN HUMAN PRN UNITS: 100 INJECTION, SOLUTION PARENTERAL at 17:15

## 2022-04-22 RX ADMIN — DEXTROSE AND SODIUM CHLORIDE PRN MLS/HR: 5; 900 INJECTION, SOLUTION INTRAVENOUS at 23:58

## 2022-04-22 RX ADMIN — SODIUM CHLORIDE SCH MG: 9 INJECTION, SOLUTION INTRAVENOUS at 08:07

## 2022-04-22 RX ADMIN — SODIUM CHLORIDE PRN MLS/HR: 9 INJECTION, SOLUTION INTRAVENOUS at 00:06

## 2022-04-22 RX ADMIN — PIPERACILLIN SODIUM AND TAZOBACTAM SODIUM SCH MLS/HR: .375; 3 INJECTION, POWDER, LYOPHILIZED, FOR SOLUTION INTRAVENOUS at 17:12

## 2022-04-22 RX ADMIN — Medication SCH EACH: at 00:22

## 2022-04-22 RX ADMIN — HEPARIN SODIUM SCH UNITS: 5000 INJECTION INTRAVENOUS; SUBCUTANEOUS at 08:07

## 2022-04-22 RX ADMIN — POTASSIUM CHLORIDE SCH MLS/HR: 200 INJECTION, SOLUTION INTRAVENOUS at 11:47

## 2022-04-22 RX ADMIN — PIPERACILLIN SODIUM AND TAZOBACTAM SODIUM SCH MLS/HR: .375; 3 INJECTION, POWDER, LYOPHILIZED, FOR SOLUTION INTRAVENOUS at 06:05

## 2022-04-22 RX ADMIN — PIPERACILLIN SODIUM AND TAZOBACTAM SODIUM SCH MLS/HR: .375; 3 INJECTION, POWDER, LYOPHILIZED, FOR SOLUTION INTRAVENOUS at 23:43

## 2022-04-22 NOTE — NUR
RN NOTES 

NGT TO LIS , PT IS VERY SENSITIVE TO LEVO , LEVO DRIP AT .01 MCG/KG/MIN, CONTINUE TO 
MONITOR. .

## 2022-04-22 NOTE — NUR
RN NOTES

RECEIVED PT ON BED, A/Ox2-3, FOLLOWS COMMAND,  ABLE TO MAKE NEEDS KNOWN, ON 3L O2 N/C , O2 
SAT WNL, PATIENT ON TELE MONITOR SHOWING NSR WITH HR OF 70'S, NO DISTRESS NOTED. RICHMOND CATH 
PATENT, DRAINING  YELLOW URINE. NGT NOTED TO LIS ,  SAFETY MEASURES IMPLEMENTED PER HOSPITAL 
PROTOCOLS. SR UP x3, CALL LIGHT WITHIN EASY REACH,   WILL CONTINUE TO MONITOR PATIENT.

## 2022-04-22 NOTE — NUR
RN NOTES

RECEIVED CARE OF PATIENT FROM AM NURSE WHILE PATIENT IN BED, A/O X4, ABLE TO MAKE NEEDS 
KNOWN, PATIENT IN NO DISCOMFORT OR PAIN AT THIS TIME. PATIENT ON O2 THERAPY VIA NC AT 
3L/MIN, O2 SAT 99%, BREATHING EVEN AND UNLABORED. PATIENT ON TELE MONITOR SHOWING NSR WITH 
HR OF 78, NO DISTRESS NOTED. RICHMOND CATH PATENT, DARNING YELLOW URINE. PATIENT ON LEVO 
RUNNING AT 0.01 MCG/KG/MIN, LATEST BP /60, PATIENT IN NO DISTRESS. PATIENT AWAITING 
TRANSFER TO CT SCAN. SAFETY MEASURES IMPLEMENTED PER HOSPITAL PROTOCOLS. WILL CONTINUE TO 
MONITOR PATIENT.

## 2022-04-22 NOTE — NUR
RN NOTES 

PT REMAINS ON LEVO AT .01 MCG/KG/MIN.  PO CONTRAST GIVEN VIA NGT. NO DISTESS NOTED, PT 
WAITING FOR CT OF ABD TO BE DONE, NO SIGNIFICANT CHANGES NOTED ON THIS SHIFT , WILL ENDORSE 
TO NIGHT SHIFT NURSE FOR CONTINUITY OF CARE .

## 2022-04-22 NOTE — NUR
RN NOTES

PATIENT SENT AND RETURNED FROM CT SCAN IN STABLE CONDITIONS VIA ACLS PROTOCOLS. PATIENT IS 
CURRENTLY IN BED, A/O X4, WITH NO SIGNIFICANT NURSING FINDINGS AFTER RETURNING FROM CT SCAN. 
ALL IMMEDIATE PATIENT NEEDS MET. WILL CONTINUE TO MONITOR.

## 2022-04-22 NOTE — NUR
RN NOTES

ENDORSED CARE OF PATIENT TO AM NURSE WHILE PATIENT IN BED, A/O X4, ABLE TO MAKE NEEDS KNOWN. 
ALL PATIENT NEEDS MET THROUGHOUT SHIFT. PATIENT ON 3 L/MIN O2 THERAPY VIA NC, O2 SAT 98%, 
BREATHING EVEN AND UNLABORED. PATIENT CURRENTLY ON LEVO DRIP AT 0.02 MCG/KG/MIN, VITAL SIGNS 
REMAIN WITHIN ORDERED LIMITS. ALL DUE MEDS GIVEN. ALL SAFETY MEASURES IMPLEMENTED PER 
HOSPITAL PROTOCOLS. ENDORSED TO AM NURSE FOR YADI.

## 2022-04-23 VITALS — DIASTOLIC BLOOD PRESSURE: 39 MMHG | SYSTOLIC BLOOD PRESSURE: 101 MMHG

## 2022-04-23 VITALS — SYSTOLIC BLOOD PRESSURE: 114 MMHG | DIASTOLIC BLOOD PRESSURE: 67 MMHG

## 2022-04-23 VITALS — SYSTOLIC BLOOD PRESSURE: 99 MMHG | DIASTOLIC BLOOD PRESSURE: 53 MMHG

## 2022-04-23 VITALS — SYSTOLIC BLOOD PRESSURE: 106 MMHG | DIASTOLIC BLOOD PRESSURE: 71 MMHG

## 2022-04-23 VITALS — SYSTOLIC BLOOD PRESSURE: 96 MMHG | DIASTOLIC BLOOD PRESSURE: 56 MMHG

## 2022-04-23 VITALS — DIASTOLIC BLOOD PRESSURE: 49 MMHG | SYSTOLIC BLOOD PRESSURE: 102 MMHG

## 2022-04-23 VITALS — DIASTOLIC BLOOD PRESSURE: 58 MMHG | SYSTOLIC BLOOD PRESSURE: 99 MMHG

## 2022-04-23 VITALS — DIASTOLIC BLOOD PRESSURE: 60 MMHG | SYSTOLIC BLOOD PRESSURE: 91 MMHG

## 2022-04-23 VITALS — DIASTOLIC BLOOD PRESSURE: 56 MMHG | SYSTOLIC BLOOD PRESSURE: 97 MMHG

## 2022-04-23 VITALS — DIASTOLIC BLOOD PRESSURE: 60 MMHG | SYSTOLIC BLOOD PRESSURE: 99 MMHG

## 2022-04-23 VITALS — DIASTOLIC BLOOD PRESSURE: 60 MMHG | SYSTOLIC BLOOD PRESSURE: 108 MMHG

## 2022-04-23 VITALS — DIASTOLIC BLOOD PRESSURE: 43 MMHG | SYSTOLIC BLOOD PRESSURE: 86 MMHG

## 2022-04-23 VITALS — DIASTOLIC BLOOD PRESSURE: 52 MMHG | SYSTOLIC BLOOD PRESSURE: 95 MMHG

## 2022-04-23 VITALS — SYSTOLIC BLOOD PRESSURE: 92 MMHG | DIASTOLIC BLOOD PRESSURE: 53 MMHG

## 2022-04-23 VITALS — SYSTOLIC BLOOD PRESSURE: 140 MMHG | DIASTOLIC BLOOD PRESSURE: 72 MMHG

## 2022-04-23 VITALS — SYSTOLIC BLOOD PRESSURE: 102 MMHG | DIASTOLIC BLOOD PRESSURE: 64 MMHG

## 2022-04-23 VITALS — SYSTOLIC BLOOD PRESSURE: 90 MMHG | DIASTOLIC BLOOD PRESSURE: 57 MMHG

## 2022-04-23 VITALS — DIASTOLIC BLOOD PRESSURE: 52 MMHG | SYSTOLIC BLOOD PRESSURE: 99 MMHG

## 2022-04-23 VITALS — DIASTOLIC BLOOD PRESSURE: 61 MMHG | SYSTOLIC BLOOD PRESSURE: 103 MMHG

## 2022-04-23 VITALS — DIASTOLIC BLOOD PRESSURE: 95 MMHG | SYSTOLIC BLOOD PRESSURE: 136 MMHG

## 2022-04-23 VITALS — SYSTOLIC BLOOD PRESSURE: 89 MMHG | DIASTOLIC BLOOD PRESSURE: 52 MMHG

## 2022-04-23 VITALS — SYSTOLIC BLOOD PRESSURE: 81 MMHG | DIASTOLIC BLOOD PRESSURE: 63 MMHG

## 2022-04-23 VITALS — DIASTOLIC BLOOD PRESSURE: 66 MMHG | SYSTOLIC BLOOD PRESSURE: 94 MMHG

## 2022-04-23 VITALS — SYSTOLIC BLOOD PRESSURE: 92 MMHG | DIASTOLIC BLOOD PRESSURE: 58 MMHG

## 2022-04-23 VITALS — SYSTOLIC BLOOD PRESSURE: 104 MMHG | DIASTOLIC BLOOD PRESSURE: 58 MMHG

## 2022-04-23 VITALS — DIASTOLIC BLOOD PRESSURE: 56 MMHG | SYSTOLIC BLOOD PRESSURE: 103 MMHG

## 2022-04-23 VITALS — SYSTOLIC BLOOD PRESSURE: 97 MMHG | DIASTOLIC BLOOD PRESSURE: 58 MMHG

## 2022-04-23 VITALS — DIASTOLIC BLOOD PRESSURE: 54 MMHG | SYSTOLIC BLOOD PRESSURE: 100 MMHG

## 2022-04-23 VITALS — SYSTOLIC BLOOD PRESSURE: 85 MMHG | DIASTOLIC BLOOD PRESSURE: 45 MMHG

## 2022-04-23 VITALS — DIASTOLIC BLOOD PRESSURE: 58 MMHG | SYSTOLIC BLOOD PRESSURE: 87 MMHG

## 2022-04-23 VITALS — SYSTOLIC BLOOD PRESSURE: 97 MMHG | DIASTOLIC BLOOD PRESSURE: 50 MMHG

## 2022-04-23 VITALS — DIASTOLIC BLOOD PRESSURE: 52 MMHG | SYSTOLIC BLOOD PRESSURE: 97 MMHG

## 2022-04-23 VITALS — SYSTOLIC BLOOD PRESSURE: 91 MMHG | DIASTOLIC BLOOD PRESSURE: 51 MMHG

## 2022-04-23 VITALS — DIASTOLIC BLOOD PRESSURE: 69 MMHG | SYSTOLIC BLOOD PRESSURE: 93 MMHG

## 2022-04-23 VITALS — DIASTOLIC BLOOD PRESSURE: 45 MMHG | SYSTOLIC BLOOD PRESSURE: 89 MMHG

## 2022-04-23 VITALS — DIASTOLIC BLOOD PRESSURE: 53 MMHG | SYSTOLIC BLOOD PRESSURE: 92 MMHG

## 2022-04-23 VITALS — SYSTOLIC BLOOD PRESSURE: 107 MMHG | DIASTOLIC BLOOD PRESSURE: 67 MMHG

## 2022-04-23 VITALS — DIASTOLIC BLOOD PRESSURE: 57 MMHG | SYSTOLIC BLOOD PRESSURE: 90 MMHG

## 2022-04-23 VITALS — DIASTOLIC BLOOD PRESSURE: 54 MMHG | SYSTOLIC BLOOD PRESSURE: 95 MMHG

## 2022-04-23 VITALS — SYSTOLIC BLOOD PRESSURE: 90 MMHG | DIASTOLIC BLOOD PRESSURE: 58 MMHG

## 2022-04-23 VITALS — DIASTOLIC BLOOD PRESSURE: 52 MMHG | SYSTOLIC BLOOD PRESSURE: 93 MMHG

## 2022-04-23 VITALS — SYSTOLIC BLOOD PRESSURE: 109 MMHG | DIASTOLIC BLOOD PRESSURE: 57 MMHG

## 2022-04-23 VITALS — SYSTOLIC BLOOD PRESSURE: 99 MMHG | DIASTOLIC BLOOD PRESSURE: 65 MMHG

## 2022-04-23 VITALS — SYSTOLIC BLOOD PRESSURE: 104 MMHG | DIASTOLIC BLOOD PRESSURE: 64 MMHG

## 2022-04-23 VITALS — DIASTOLIC BLOOD PRESSURE: 52 MMHG | SYSTOLIC BLOOD PRESSURE: 91 MMHG

## 2022-04-23 VITALS — DIASTOLIC BLOOD PRESSURE: 64 MMHG | SYSTOLIC BLOOD PRESSURE: 90 MMHG

## 2022-04-23 VITALS — SYSTOLIC BLOOD PRESSURE: 93 MMHG | DIASTOLIC BLOOD PRESSURE: 59 MMHG

## 2022-04-23 VITALS — DIASTOLIC BLOOD PRESSURE: 58 MMHG | SYSTOLIC BLOOD PRESSURE: 96 MMHG

## 2022-04-23 VITALS — SYSTOLIC BLOOD PRESSURE: 93 MMHG | DIASTOLIC BLOOD PRESSURE: 48 MMHG

## 2022-04-23 VITALS — DIASTOLIC BLOOD PRESSURE: 56 MMHG | SYSTOLIC BLOOD PRESSURE: 115 MMHG

## 2022-04-23 VITALS — DIASTOLIC BLOOD PRESSURE: 57 MMHG | SYSTOLIC BLOOD PRESSURE: 97 MMHG

## 2022-04-23 VITALS — SYSTOLIC BLOOD PRESSURE: 85 MMHG | DIASTOLIC BLOOD PRESSURE: 51 MMHG

## 2022-04-23 VITALS — DIASTOLIC BLOOD PRESSURE: 51 MMHG | SYSTOLIC BLOOD PRESSURE: 95 MMHG

## 2022-04-23 VITALS — SYSTOLIC BLOOD PRESSURE: 91 MMHG | DIASTOLIC BLOOD PRESSURE: 47 MMHG

## 2022-04-23 VITALS — DIASTOLIC BLOOD PRESSURE: 58 MMHG | SYSTOLIC BLOOD PRESSURE: 103 MMHG

## 2022-04-23 VITALS — DIASTOLIC BLOOD PRESSURE: 49 MMHG | SYSTOLIC BLOOD PRESSURE: 90 MMHG

## 2022-04-23 VITALS — SYSTOLIC BLOOD PRESSURE: 126 MMHG | DIASTOLIC BLOOD PRESSURE: 66 MMHG

## 2022-04-23 VITALS — DIASTOLIC BLOOD PRESSURE: 55 MMHG | SYSTOLIC BLOOD PRESSURE: 91 MMHG

## 2022-04-23 VITALS — DIASTOLIC BLOOD PRESSURE: 56 MMHG | SYSTOLIC BLOOD PRESSURE: 94 MMHG

## 2022-04-23 VITALS — SYSTOLIC BLOOD PRESSURE: 140 MMHG | DIASTOLIC BLOOD PRESSURE: 71 MMHG

## 2022-04-23 VITALS — DIASTOLIC BLOOD PRESSURE: 58 MMHG | SYSTOLIC BLOOD PRESSURE: 105 MMHG

## 2022-04-23 VITALS — DIASTOLIC BLOOD PRESSURE: 52 MMHG | SYSTOLIC BLOOD PRESSURE: 94 MMHG

## 2022-04-23 VITALS — DIASTOLIC BLOOD PRESSURE: 49 MMHG | SYSTOLIC BLOOD PRESSURE: 87 MMHG

## 2022-04-23 VITALS — DIASTOLIC BLOOD PRESSURE: 52 MMHG | SYSTOLIC BLOOD PRESSURE: 87 MMHG

## 2022-04-23 VITALS — DIASTOLIC BLOOD PRESSURE: 45 MMHG | SYSTOLIC BLOOD PRESSURE: 101 MMHG

## 2022-04-23 VITALS — SYSTOLIC BLOOD PRESSURE: 94 MMHG | DIASTOLIC BLOOD PRESSURE: 64 MMHG

## 2022-04-23 VITALS — SYSTOLIC BLOOD PRESSURE: 87 MMHG | DIASTOLIC BLOOD PRESSURE: 55 MMHG

## 2022-04-23 VITALS — DIASTOLIC BLOOD PRESSURE: 77 MMHG | SYSTOLIC BLOOD PRESSURE: 106 MMHG

## 2022-04-23 VITALS — DIASTOLIC BLOOD PRESSURE: 53 MMHG | SYSTOLIC BLOOD PRESSURE: 98 MMHG

## 2022-04-23 VITALS — SYSTOLIC BLOOD PRESSURE: 98 MMHG | DIASTOLIC BLOOD PRESSURE: 49 MMHG

## 2022-04-23 VITALS — SYSTOLIC BLOOD PRESSURE: 99 MMHG | DIASTOLIC BLOOD PRESSURE: 55 MMHG

## 2022-04-23 VITALS — SYSTOLIC BLOOD PRESSURE: 94 MMHG | DIASTOLIC BLOOD PRESSURE: 52 MMHG

## 2022-04-23 VITALS — DIASTOLIC BLOOD PRESSURE: 59 MMHG | SYSTOLIC BLOOD PRESSURE: 96 MMHG

## 2022-04-23 VITALS — SYSTOLIC BLOOD PRESSURE: 99 MMHG | DIASTOLIC BLOOD PRESSURE: 60 MMHG

## 2022-04-23 VITALS — DIASTOLIC BLOOD PRESSURE: 56 MMHG | SYSTOLIC BLOOD PRESSURE: 96 MMHG

## 2022-04-23 VITALS — DIASTOLIC BLOOD PRESSURE: 55 MMHG | SYSTOLIC BLOOD PRESSURE: 93 MMHG

## 2022-04-23 VITALS — SYSTOLIC BLOOD PRESSURE: 92 MMHG | DIASTOLIC BLOOD PRESSURE: 52 MMHG

## 2022-04-23 VITALS — DIASTOLIC BLOOD PRESSURE: 68 MMHG | SYSTOLIC BLOOD PRESSURE: 132 MMHG

## 2022-04-23 VITALS — SYSTOLIC BLOOD PRESSURE: 103 MMHG | DIASTOLIC BLOOD PRESSURE: 50 MMHG

## 2022-04-23 VITALS — SYSTOLIC BLOOD PRESSURE: 90 MMHG | DIASTOLIC BLOOD PRESSURE: 53 MMHG

## 2022-04-23 LAB
BASOPHILS # BLD AUTO: 0 K/UL (ref 0–0.2)
BASOPHILS NFR BLD AUTO: 0.2 % (ref 0–2)
BUN SERPL-MCNC: 29 MG/DL (ref 7–18)
CALCIUM SERPL-MCNC: 7.3 MG/DL (ref 8.5–10.1)
CHLORIDE SERPL-SCNC: 103 MMOL/L (ref 98–107)
CO2 SERPL-SCNC: 22 MMOL/L (ref 21–32)
CREAT SERPL-MCNC: 4 MG/DL (ref 0.6–1.3)
EOSINOPHIL NFR BLD AUTO: 1.2 % (ref 0–6)
GLUCOSE SERPL-MCNC: 137 MG/DL (ref 74–106)
HCT VFR BLD AUTO: 32 % (ref 33–45)
HGB BLD-MCNC: 9.6 G/DL (ref 11.5–14.8)
LYMPHOCYTES NFR BLD AUTO: 0.8 K/UL (ref 0.8–4.8)
LYMPHOCYTES NFR BLD AUTO: 4.4 % (ref 20–44)
MAGNESIUM SERPL-MCNC: 2.2 MG/DL (ref 1.8–2.4)
MCHC RBC AUTO-ENTMCNC: 31 G/DL (ref 31–36)
MCV RBC AUTO: 79 FL (ref 82–100)
MONOCYTES NFR BLD AUTO: 12.4 % (ref 2–12)
MONOCYTES NFR BLD AUTO: 2.2 K/UL (ref 0.1–1.3)
NEUTROPHILS # BLD AUTO: 14.6 K/UL (ref 1.8–8.9)
NEUTROPHILS NFR BLD AUTO: 81.8 % (ref 43–81)
PHOSPHATE SERPL-MCNC: 7.7 MG/DL (ref 2.5–4.9)
PLATELET # BLD AUTO: 299 K/UL (ref 150–450)
POTASSIUM SERPL-SCNC: 4.2 MMOL/L (ref 3.5–5.1)
RBC # BLD AUTO: 3.99 MIL/UL (ref 4–5.2)
SODIUM SERPL-SCNC: 137 MMOL/L (ref 136–145)
VANCOMYCIN SERPL-MCNC: 13 UG/ML (ref 18–26)
WBC NRBC COR # BLD AUTO: 17.8 K/UL (ref 4.3–11)

## 2022-04-23 RX ADMIN — Medication SCH EACH: at 06:33

## 2022-04-23 RX ADMIN — SODIUM CHLORIDE SCH MG: 9 INJECTION, SOLUTION INTRAVENOUS at 09:09

## 2022-04-23 RX ADMIN — PIPERACILLIN SODIUM AND TAZOBACTAM SODIUM SCH MLS/HR: .375; 3 INJECTION, POWDER, LYOPHILIZED, FOR SOLUTION INTRAVENOUS at 05:36

## 2022-04-23 RX ADMIN — INSULIN HUMAN PRN UNITS: 100 INJECTION, SOLUTION PARENTERAL at 06:34

## 2022-04-23 RX ADMIN — HEPARIN SODIUM SCH UNITS: 5000 INJECTION INTRAVENOUS; SUBCUTANEOUS at 16:47

## 2022-04-23 RX ADMIN — INSULIN HUMAN PRN UNITS: 100 INJECTION, SOLUTION PARENTERAL at 00:42

## 2022-04-23 RX ADMIN — HEPARIN SODIUM SCH UNITS: 5000 INJECTION INTRAVENOUS; SUBCUTANEOUS at 09:12

## 2022-04-23 RX ADMIN — INSULIN GLARGINE SCH UNIT: 100 INJECTION, SOLUTION SUBCUTANEOUS at 23:17

## 2022-04-23 RX ADMIN — Medication SCH EACH: at 16:44

## 2022-04-23 RX ADMIN — DEXTROSE AND SODIUM CHLORIDE PRN MLS/HR: 5; 900 INJECTION, SOLUTION INTRAVENOUS at 18:15

## 2022-04-23 RX ADMIN — Medication SCH EACH: at 11:50

## 2022-04-23 RX ADMIN — Medication SCH EACH: at 23:14

## 2022-04-23 RX ADMIN — DEXTROSE AND SODIUM CHLORIDE PRN MLS/HR: 5; 900 INJECTION, SOLUTION INTRAVENOUS at 09:49

## 2022-04-23 RX ADMIN — Medication SCH EACH: at 00:43

## 2022-04-23 RX ADMIN — PIPERACILLIN SODIUM AND TAZOBACTAM SODIUM SCH MLS/HR: .25; 2 INJECTION, POWDER, LYOPHILIZED, FOR SOLUTION INTRAVENOUS at 12:57

## 2022-04-23 RX ADMIN — INSULIN HUMAN PRN UNITS: 100 INJECTION, SOLUTION PARENTERAL at 23:18

## 2022-04-23 RX ADMIN — PIPERACILLIN SODIUM AND TAZOBACTAM SODIUM SCH MLS/HR: .25; 2 INJECTION, POWDER, LYOPHILIZED, FOR SOLUTION INTRAVENOUS at 21:30

## 2022-04-23 NOTE — NUR
RN NOTES

PATIENT REMAINS IN BED, SLEEPING, WAKES UP TO NAME. PATIENT A/O X4, ABLE TO MAKE NEEDS 
KNOWN. ALL PATIENT NEEDS MET THROUGHOUT SHIFT. ALL DUE MED GIVEN. PATIENT REMAINS ON LEVO 
DRIP RUNNING AT 0.01 MCG/KG/MIN, VITAL SINGS REMAIN WITHIN ORDERED PARAMETERS. NO 
SIGNIFICANT FINDINGS UPON ALL NURSING ASSESSMENTS. ALL SAFETY MEASURES IMPLEMENTED 
THROUGHOUT SHIFT ACCORDING TO HOSPITAL PROTOCOLS. WILL ENDORSE TO AM NURSE FOR CONTINUITY OF 
CARE.

## 2022-04-23 NOTE — NUR
rn notes

 patient pm care done, vss, due medication administered. assist turn and reposition q 2 hr. 
patient still on ngt suction, ok for ice chips. patient refused pain, redness perineal area. 
call light within to reach. endorsed oncoming nurse follow plan of care.

## 2022-04-23 NOTE — NUR
RN NOTES

 PATIENT RECEIVED IN THE BED A/A/OX3, ON  [id number]%, HR- 70. PATIENT REFUSED PAIN,  NGT 
INTERMITTENT SUCTIONING , NO OUTPUT AT THIS TIME. INFUSING LEVOPHED 0.01 MCG/KG/HR, AND D5NS 
@125ML/HR ON SAMARA MIDLINE INTACT. PATIENT OBESE, DISTENDED ABDOMEN. BED BOUND, RICHMOND DRAINING 
LOW OUTPUT. ASSIST TURN AND REPOSTION Q 2 HR, AM MEDICATION ADMINISTERED. ASSIST TURN AND 
REPOSTION. PER Dr ALMANZA ORDER  PATIENT WILL TAKE SMALL AMOUNT OF ICE CHIPS PO. ORDER TAKEN 
AND CARRIED OUT. NEEDS ATTENDED AND ANTICIPATED. CALL LIGHT WITHIN TO REACH.  WILL FOLLOW 
UP.

## 2022-04-24 VITALS — DIASTOLIC BLOOD PRESSURE: 62 MMHG | SYSTOLIC BLOOD PRESSURE: 96 MMHG

## 2022-04-24 VITALS — DIASTOLIC BLOOD PRESSURE: 69 MMHG | SYSTOLIC BLOOD PRESSURE: 116 MMHG

## 2022-04-24 VITALS — DIASTOLIC BLOOD PRESSURE: 56 MMHG | SYSTOLIC BLOOD PRESSURE: 92 MMHG

## 2022-04-24 VITALS — SYSTOLIC BLOOD PRESSURE: 95 MMHG | DIASTOLIC BLOOD PRESSURE: 57 MMHG

## 2022-04-24 VITALS — DIASTOLIC BLOOD PRESSURE: 56 MMHG | SYSTOLIC BLOOD PRESSURE: 98 MMHG

## 2022-04-24 VITALS — SYSTOLIC BLOOD PRESSURE: 107 MMHG | DIASTOLIC BLOOD PRESSURE: 58 MMHG

## 2022-04-24 VITALS — DIASTOLIC BLOOD PRESSURE: 58 MMHG | SYSTOLIC BLOOD PRESSURE: 112 MMHG

## 2022-04-24 VITALS — DIASTOLIC BLOOD PRESSURE: 50 MMHG | SYSTOLIC BLOOD PRESSURE: 93 MMHG

## 2022-04-24 VITALS — DIASTOLIC BLOOD PRESSURE: 56 MMHG | SYSTOLIC BLOOD PRESSURE: 93 MMHG

## 2022-04-24 VITALS — DIASTOLIC BLOOD PRESSURE: 62 MMHG | SYSTOLIC BLOOD PRESSURE: 107 MMHG

## 2022-04-24 VITALS — DIASTOLIC BLOOD PRESSURE: 61 MMHG | SYSTOLIC BLOOD PRESSURE: 119 MMHG

## 2022-04-24 VITALS — DIASTOLIC BLOOD PRESSURE: 48 MMHG | SYSTOLIC BLOOD PRESSURE: 90 MMHG

## 2022-04-24 VITALS — SYSTOLIC BLOOD PRESSURE: 110 MMHG | DIASTOLIC BLOOD PRESSURE: 60 MMHG

## 2022-04-24 VITALS — SYSTOLIC BLOOD PRESSURE: 101 MMHG | DIASTOLIC BLOOD PRESSURE: 55 MMHG

## 2022-04-24 VITALS — SYSTOLIC BLOOD PRESSURE: 100 MMHG | DIASTOLIC BLOOD PRESSURE: 63 MMHG

## 2022-04-24 VITALS — SYSTOLIC BLOOD PRESSURE: 91 MMHG | DIASTOLIC BLOOD PRESSURE: 62 MMHG

## 2022-04-24 VITALS — SYSTOLIC BLOOD PRESSURE: 91 MMHG | DIASTOLIC BLOOD PRESSURE: 46 MMHG

## 2022-04-24 VITALS — DIASTOLIC BLOOD PRESSURE: 57 MMHG | SYSTOLIC BLOOD PRESSURE: 91 MMHG

## 2022-04-24 VITALS — SYSTOLIC BLOOD PRESSURE: 106 MMHG | DIASTOLIC BLOOD PRESSURE: 56 MMHG

## 2022-04-24 VITALS — DIASTOLIC BLOOD PRESSURE: 55 MMHG | SYSTOLIC BLOOD PRESSURE: 92 MMHG

## 2022-04-24 VITALS — DIASTOLIC BLOOD PRESSURE: 60 MMHG | SYSTOLIC BLOOD PRESSURE: 99 MMHG

## 2022-04-24 VITALS — DIASTOLIC BLOOD PRESSURE: 64 MMHG | SYSTOLIC BLOOD PRESSURE: 99 MMHG

## 2022-04-24 VITALS — SYSTOLIC BLOOD PRESSURE: 105 MMHG | DIASTOLIC BLOOD PRESSURE: 56 MMHG

## 2022-04-24 VITALS — SYSTOLIC BLOOD PRESSURE: 94 MMHG | DIASTOLIC BLOOD PRESSURE: 60 MMHG

## 2022-04-24 VITALS — DIASTOLIC BLOOD PRESSURE: 65 MMHG | SYSTOLIC BLOOD PRESSURE: 101 MMHG

## 2022-04-24 VITALS — DIASTOLIC BLOOD PRESSURE: 59 MMHG | SYSTOLIC BLOOD PRESSURE: 94 MMHG

## 2022-04-24 VITALS — DIASTOLIC BLOOD PRESSURE: 64 MMHG | SYSTOLIC BLOOD PRESSURE: 103 MMHG

## 2022-04-24 VITALS — SYSTOLIC BLOOD PRESSURE: 95 MMHG | DIASTOLIC BLOOD PRESSURE: 53 MMHG

## 2022-04-24 VITALS — SYSTOLIC BLOOD PRESSURE: 104 MMHG | DIASTOLIC BLOOD PRESSURE: 58 MMHG

## 2022-04-24 VITALS — DIASTOLIC BLOOD PRESSURE: 66 MMHG | SYSTOLIC BLOOD PRESSURE: 115 MMHG

## 2022-04-24 VITALS — DIASTOLIC BLOOD PRESSURE: 50 MMHG | SYSTOLIC BLOOD PRESSURE: 94 MMHG

## 2022-04-24 VITALS — SYSTOLIC BLOOD PRESSURE: 105 MMHG | DIASTOLIC BLOOD PRESSURE: 70 MMHG

## 2022-04-24 VITALS — SYSTOLIC BLOOD PRESSURE: 92 MMHG | DIASTOLIC BLOOD PRESSURE: 57 MMHG

## 2022-04-24 VITALS — DIASTOLIC BLOOD PRESSURE: 81 MMHG | SYSTOLIC BLOOD PRESSURE: 107 MMHG

## 2022-04-24 VITALS — SYSTOLIC BLOOD PRESSURE: 101 MMHG | DIASTOLIC BLOOD PRESSURE: 58 MMHG

## 2022-04-24 VITALS — DIASTOLIC BLOOD PRESSURE: 63 MMHG | SYSTOLIC BLOOD PRESSURE: 100 MMHG

## 2022-04-24 VITALS — DIASTOLIC BLOOD PRESSURE: 58 MMHG | SYSTOLIC BLOOD PRESSURE: 99 MMHG

## 2022-04-24 VITALS — DIASTOLIC BLOOD PRESSURE: 65 MMHG | SYSTOLIC BLOOD PRESSURE: 92 MMHG

## 2022-04-24 VITALS — SYSTOLIC BLOOD PRESSURE: 94 MMHG | DIASTOLIC BLOOD PRESSURE: 49 MMHG

## 2022-04-24 VITALS — DIASTOLIC BLOOD PRESSURE: 70 MMHG | SYSTOLIC BLOOD PRESSURE: 106 MMHG

## 2022-04-24 VITALS — SYSTOLIC BLOOD PRESSURE: 99 MMHG | DIASTOLIC BLOOD PRESSURE: 63 MMHG

## 2022-04-24 VITALS — SYSTOLIC BLOOD PRESSURE: 115 MMHG | DIASTOLIC BLOOD PRESSURE: 63 MMHG

## 2022-04-24 VITALS — DIASTOLIC BLOOD PRESSURE: 59 MMHG | SYSTOLIC BLOOD PRESSURE: 99 MMHG

## 2022-04-24 VITALS — SYSTOLIC BLOOD PRESSURE: 99 MMHG | DIASTOLIC BLOOD PRESSURE: 68 MMHG

## 2022-04-24 VITALS — DIASTOLIC BLOOD PRESSURE: 64 MMHG | SYSTOLIC BLOOD PRESSURE: 114 MMHG

## 2022-04-24 VITALS — SYSTOLIC BLOOD PRESSURE: 101 MMHG | DIASTOLIC BLOOD PRESSURE: 61 MMHG

## 2022-04-24 VITALS — DIASTOLIC BLOOD PRESSURE: 56 MMHG | SYSTOLIC BLOOD PRESSURE: 107 MMHG

## 2022-04-24 LAB
BASOPHILS # BLD AUTO: 0.1 K/UL (ref 0–0.2)
BASOPHILS NFR BLD AUTO: 0.5 % (ref 0–2)
BUN SERPL-MCNC: 20 MG/DL (ref 7–18)
CALCIUM SERPL-MCNC: 7.5 MG/DL (ref 8.5–10.1)
CHLORIDE SERPL-SCNC: 107 MMOL/L (ref 98–107)
CO2 SERPL-SCNC: 25 MMOL/L (ref 21–32)
CREAT SERPL-MCNC: 3.1 MG/DL (ref 0.6–1.3)
EOSINOPHIL NFR BLD AUTO: 1.7 % (ref 0–6)
GLUCOSE SERPL-MCNC: 125 MG/DL (ref 74–106)
HCT VFR BLD AUTO: 31 % (ref 33–45)
HGB BLD-MCNC: 9.5 G/DL (ref 11.5–14.8)
LYMPHOCYTES NFR BLD AUTO: 0.7 K/UL (ref 0.8–4.8)
LYMPHOCYTES NFR BLD AUTO: 5.2 % (ref 20–44)
MAGNESIUM SERPL-MCNC: 1.9 MG/DL (ref 1.8–2.4)
MCHC RBC AUTO-ENTMCNC: 30 G/DL (ref 31–36)
MCV RBC AUTO: 79 FL (ref 82–100)
MONOCYTES NFR BLD AUTO: 1.2 K/UL (ref 0.1–1.3)
MONOCYTES NFR BLD AUTO: 9.5 % (ref 2–12)
NEUTROPHILS # BLD AUTO: 10.7 K/UL (ref 1.8–8.9)
NEUTROPHILS NFR BLD AUTO: 83.1 % (ref 43–81)
PHOSPHATE SERPL-MCNC: 6 MG/DL (ref 2.5–4.9)
PLATELET # BLD AUTO: 254 K/UL (ref 150–450)
POTASSIUM SERPL-SCNC: 3.8 MMOL/L (ref 3.5–5.1)
RBC # BLD AUTO: 3.99 MIL/UL (ref 4–5.2)
SODIUM SERPL-SCNC: 139 MMOL/L (ref 136–145)
WBC NRBC COR # BLD AUTO: 12.9 K/UL (ref 4.3–11)

## 2022-04-24 RX ADMIN — HEPARIN SODIUM SCH UNITS: 5000 INJECTION INTRAVENOUS; SUBCUTANEOUS at 08:54

## 2022-04-24 RX ADMIN — PIPERACILLIN SODIUM AND TAZOBACTAM SODIUM SCH MLS/HR: .25; 2 INJECTION, POWDER, LYOPHILIZED, FOR SOLUTION INTRAVENOUS at 15:51

## 2022-04-24 RX ADMIN — SODIUM CHLORIDE SCH MG: 9 INJECTION, SOLUTION INTRAVENOUS at 08:53

## 2022-04-24 RX ADMIN — DEXTROSE AND SODIUM CHLORIDE PRN MLS/HR: 5; 900 INJECTION, SOLUTION INTRAVENOUS at 02:29

## 2022-04-24 RX ADMIN — MINERAL SUPPLEMENT IRON 300 MG / 5 ML STRENGTH LIQUID 100 PER BOX UNFLAVORED SCH MG: at 08:59

## 2022-04-24 RX ADMIN — DEXTROSE AND SODIUM CHLORIDE PRN MLS/HR: 5; 900 INJECTION, SOLUTION INTRAVENOUS at 11:04

## 2022-04-24 RX ADMIN — DEXTROSE AND SODIUM CHLORIDE PRN MLS/HR: 5; 900 INJECTION, SOLUTION INTRAVENOUS at 23:50

## 2022-04-24 RX ADMIN — PIPERACILLIN SODIUM AND TAZOBACTAM SODIUM SCH MLS/HR: .25; 2 INJECTION, POWDER, LYOPHILIZED, FOR SOLUTION INTRAVENOUS at 21:34

## 2022-04-24 RX ADMIN — Medication SCH EACH: at 15:51

## 2022-04-24 RX ADMIN — Medication SCH EACH: at 16:55

## 2022-04-24 RX ADMIN — Medication SCH EACH: at 06:12

## 2022-04-24 RX ADMIN — INSULIN GLARGINE SCH UNIT: 100 INJECTION, SOLUTION SUBCUTANEOUS at 22:00

## 2022-04-24 RX ADMIN — Medication SCH EACH: at 23:20

## 2022-04-24 RX ADMIN — HEPARIN SODIUM SCH UNITS: 5000 INJECTION INTRAVENOUS; SUBCUTANEOUS at 17:01

## 2022-04-24 RX ADMIN — PIPERACILLIN SODIUM AND TAZOBACTAM SODIUM SCH MLS/HR: .25; 2 INJECTION, POWDER, LYOPHILIZED, FOR SOLUTION INTRAVENOUS at 04:00

## 2022-04-24 NOTE — NUR
RN NOTES 

ASSIST PATIENT  EATING, TOLERATED DINNER 15%, VSS, NO ACUTE RESPIRATORY DISTRESS, O2-4L, 
RICHMOND OUTPUT WAS 100ML. DUE MEDICATION ADMINISTERED, PM CARE DONE, ASSIST TURN AND 
REPOSITION Q 2 HR.  ENDORSED ONCOMING NURSE YADI.

## 2022-04-24 NOTE — NUR
RN NOTES:



PT'S BLOOD SUGAR 98. LANTUS NOT ADMINISTERED. NO S/S OF HYPER/HYPOGLYCEMIA. WILL CONTINUE TO 
MONITOR.

## 2022-04-24 NOTE — NUR
RN NOTES

 CTA DONE WITH CONTRAST,  SEEN HOSPITALIST Dr KIRKPATRICK REMOVED NGT  AT THIS TIME. PATIENT 
CCHO 60G DIET.

## 2022-04-24 NOTE — NUR
ICU RN NOTES:



RECEIVED REPORT FROM CODY BOWMAN. RECEIVED PT IN BED, AWAKE, A/OX4, VERBALLY RESPONSIVE. ON 
O2 AT 3L/MIN VIA N/C AND PT TOLERATED WELL. IV ACCESS SAMARA MIDLINE, LT WRIST #20G INTACT AND 
PATENT. RIJ HD CATH INTACT AND PATENT AND COVERED WITH DRY DRESSING. NO BLEEDING NOTED. NO 
C/O PAIN OR DISCOMFORT. NO ACUTE DISTRESS. RICHMOND CATHETER INTACT WITH YELLOWISH/CLEAR URINE. 
ONLY 50CC OF URINE NOTED. ALL SAFETY MEASURES IN PLACE. BED IN LOWEST POSITION AND LOCKED. 
SIDE RAILS UP X3, PLACE CALL LIGHT WITH IN REACH. WILL CONTINUE TO MONITOR

## 2022-04-24 NOTE — NUR
rn notes 

Seen patient on o2-4lnc, and ngt intermittent suction, patient awake, refused pain, sign 
consent form for CT. Lara draining small amount of urine. patient ok for ice chips.  due 
medication administered, assist turn and reposition q 2 hr. will follow up.

## 2022-04-25 VITALS — SYSTOLIC BLOOD PRESSURE: 92 MMHG | DIASTOLIC BLOOD PRESSURE: 51 MMHG

## 2022-04-25 VITALS — DIASTOLIC BLOOD PRESSURE: 62 MMHG | SYSTOLIC BLOOD PRESSURE: 93 MMHG

## 2022-04-25 VITALS — DIASTOLIC BLOOD PRESSURE: 58 MMHG | SYSTOLIC BLOOD PRESSURE: 85 MMHG

## 2022-04-25 VITALS — SYSTOLIC BLOOD PRESSURE: 91 MMHG | DIASTOLIC BLOOD PRESSURE: 59 MMHG

## 2022-04-25 VITALS — SYSTOLIC BLOOD PRESSURE: 103 MMHG | DIASTOLIC BLOOD PRESSURE: 70 MMHG

## 2022-04-25 VITALS — DIASTOLIC BLOOD PRESSURE: 58 MMHG | SYSTOLIC BLOOD PRESSURE: 103 MMHG

## 2022-04-25 VITALS — SYSTOLIC BLOOD PRESSURE: 100 MMHG | DIASTOLIC BLOOD PRESSURE: 58 MMHG

## 2022-04-25 VITALS — SYSTOLIC BLOOD PRESSURE: 103 MMHG | DIASTOLIC BLOOD PRESSURE: 65 MMHG

## 2022-04-25 VITALS — SYSTOLIC BLOOD PRESSURE: 92 MMHG | DIASTOLIC BLOOD PRESSURE: 52 MMHG

## 2022-04-25 VITALS — SYSTOLIC BLOOD PRESSURE: 91 MMHG | DIASTOLIC BLOOD PRESSURE: 56 MMHG

## 2022-04-25 VITALS — DIASTOLIC BLOOD PRESSURE: 54 MMHG | SYSTOLIC BLOOD PRESSURE: 94 MMHG

## 2022-04-25 VITALS — DIASTOLIC BLOOD PRESSURE: 54 MMHG | SYSTOLIC BLOOD PRESSURE: 86 MMHG

## 2022-04-25 VITALS — DIASTOLIC BLOOD PRESSURE: 58 MMHG | SYSTOLIC BLOOD PRESSURE: 102 MMHG

## 2022-04-25 VITALS — SYSTOLIC BLOOD PRESSURE: 103 MMHG | DIASTOLIC BLOOD PRESSURE: 58 MMHG

## 2022-04-25 VITALS — DIASTOLIC BLOOD PRESSURE: 54 MMHG | SYSTOLIC BLOOD PRESSURE: 93 MMHG

## 2022-04-25 VITALS — DIASTOLIC BLOOD PRESSURE: 56 MMHG | SYSTOLIC BLOOD PRESSURE: 98 MMHG

## 2022-04-25 VITALS — DIASTOLIC BLOOD PRESSURE: 51 MMHG | SYSTOLIC BLOOD PRESSURE: 88 MMHG

## 2022-04-25 VITALS — SYSTOLIC BLOOD PRESSURE: 96 MMHG | DIASTOLIC BLOOD PRESSURE: 51 MMHG

## 2022-04-25 VITALS — SYSTOLIC BLOOD PRESSURE: 93 MMHG | DIASTOLIC BLOOD PRESSURE: 56 MMHG

## 2022-04-25 VITALS — DIASTOLIC BLOOD PRESSURE: 65 MMHG | SYSTOLIC BLOOD PRESSURE: 95 MMHG

## 2022-04-25 VITALS — DIASTOLIC BLOOD PRESSURE: 59 MMHG | SYSTOLIC BLOOD PRESSURE: 94 MMHG

## 2022-04-25 VITALS — DIASTOLIC BLOOD PRESSURE: 64 MMHG | SYSTOLIC BLOOD PRESSURE: 102 MMHG

## 2022-04-25 VITALS — DIASTOLIC BLOOD PRESSURE: 57 MMHG | SYSTOLIC BLOOD PRESSURE: 95 MMHG

## 2022-04-25 VITALS — DIASTOLIC BLOOD PRESSURE: 60 MMHG | SYSTOLIC BLOOD PRESSURE: 97 MMHG

## 2022-04-25 VITALS — SYSTOLIC BLOOD PRESSURE: 88 MMHG | DIASTOLIC BLOOD PRESSURE: 61 MMHG

## 2022-04-25 VITALS — DIASTOLIC BLOOD PRESSURE: 59 MMHG | SYSTOLIC BLOOD PRESSURE: 93 MMHG

## 2022-04-25 VITALS — DIASTOLIC BLOOD PRESSURE: 49 MMHG | SYSTOLIC BLOOD PRESSURE: 91 MMHG

## 2022-04-25 VITALS — SYSTOLIC BLOOD PRESSURE: 96 MMHG | DIASTOLIC BLOOD PRESSURE: 56 MMHG

## 2022-04-25 VITALS — DIASTOLIC BLOOD PRESSURE: 53 MMHG | SYSTOLIC BLOOD PRESSURE: 82 MMHG

## 2022-04-25 VITALS — SYSTOLIC BLOOD PRESSURE: 96 MMHG | DIASTOLIC BLOOD PRESSURE: 60 MMHG

## 2022-04-25 VITALS — SYSTOLIC BLOOD PRESSURE: 95 MMHG | DIASTOLIC BLOOD PRESSURE: 52 MMHG

## 2022-04-25 LAB
BASOPHILS # BLD AUTO: 0.1 K/UL (ref 0–0.2)
BASOPHILS NFR BLD AUTO: 0.4 % (ref 0–2)
BUN SERPL-MCNC: 22 MG/DL (ref 7–18)
CALCIUM SERPL-MCNC: 7.9 MG/DL (ref 8.5–10.1)
CANCER AG125 SERPL-ACNC: 67.9 U/ML (ref 0–38.1)
CHLORIDE SERPL-SCNC: 106 MMOL/L (ref 98–107)
CO2 SERPL-SCNC: 23 MMOL/L (ref 21–32)
CREAT SERPL-MCNC: 3.7 MG/DL (ref 0.6–1.3)
EOSINOPHIL NFR BLD AUTO: 0.9 % (ref 0–6)
GLUCOSE SERPL-MCNC: 130 MG/DL (ref 74–106)
HCT VFR BLD AUTO: 34 % (ref 33–45)
HEMOCCULT STL QL: NEGATIVE
HGB BLD-MCNC: 10.4 G/DL (ref 11.5–14.8)
IGA SERPL-MCNC: 116 MG/DL (ref 87–352)
IGM SERPL-MCNC: 116 MG/DL (ref 26–217)
LYMPHOCYTES NFR BLD AUTO: 0.6 K/UL (ref 0.8–4.8)
LYMPHOCYTES NFR BLD AUTO: 3.4 % (ref 20–44)
MAGNESIUM SERPL-MCNC: 2 MG/DL (ref 1.8–2.4)
MCHC RBC AUTO-ENTMCNC: 30 G/DL (ref 31–36)
MCV RBC AUTO: 80 FL (ref 82–100)
MONOCYTES NFR BLD AUTO: 1.1 K/UL (ref 0.1–1.3)
MONOCYTES NFR BLD AUTO: 6.6 % (ref 2–12)
NEUTROPHILS # BLD AUTO: 15.1 K/UL (ref 1.8–8.9)
NEUTROPHILS NFR BLD AUTO: 88.7 % (ref 43–81)
PHOSPHATE SERPL-MCNC: 7.2 MG/DL (ref 2.5–4.9)
PLATELET # BLD AUTO: 258 K/UL (ref 150–450)
POTASSIUM SERPL-SCNC: 3.8 MMOL/L (ref 3.5–5.1)
RBC # BLD AUTO: 4.3 MIL/UL (ref 4–5.2)
SODIUM SERPL-SCNC: 139 MMOL/L (ref 136–145)
VANCOMYCIN SERPL-MCNC: 21 UG/ML (ref 18–26)
WBC NRBC COR # BLD AUTO: 17.1 K/UL (ref 4.3–11)

## 2022-04-25 RX ADMIN — DEXTROSE AND SODIUM CHLORIDE PRN MLS/HR: 5; 900 INJECTION, SOLUTION INTRAVENOUS at 09:08

## 2022-04-25 RX ADMIN — INSULIN HUMAN PRN UNITS: 100 INJECTION, SOLUTION PARENTERAL at 17:37

## 2022-04-25 RX ADMIN — Medication SCH EACH: at 17:36

## 2022-04-25 RX ADMIN — Medication SCH EACH: at 13:12

## 2022-04-25 RX ADMIN — Medication SCH EACH: at 06:03

## 2022-04-25 RX ADMIN — PANTOPRAZOLE SODIUM SCH MG: 40 TABLET, DELAYED RELEASE ORAL at 08:43

## 2022-04-25 RX ADMIN — INSULIN HUMAN PRN UNITS: 100 INJECTION, SOLUTION PARENTERAL at 23:08

## 2022-04-25 RX ADMIN — Medication SCH EACH: at 23:08

## 2022-04-25 RX ADMIN — INSULIN GLARGINE SCH UNIT: 100 INJECTION, SOLUTION SUBCUTANEOUS at 22:00

## 2022-04-25 RX ADMIN — MINERAL SUPPLEMENT IRON 300 MG / 5 ML STRENGTH LIQUID 100 PER BOX UNFLAVORED SCH MG: at 08:43

## 2022-04-25 RX ADMIN — PIPERACILLIN SODIUM AND TAZOBACTAM SODIUM SCH MLS/HR: .25; 2 INJECTION, POWDER, LYOPHILIZED, FOR SOLUTION INTRAVENOUS at 13:11

## 2022-04-25 RX ADMIN — HEPARIN SODIUM SCH UNITS: 5000 INJECTION INTRAVENOUS; SUBCUTANEOUS at 08:44

## 2022-04-25 RX ADMIN — PIPERACILLIN SODIUM AND TAZOBACTAM SODIUM SCH MLS/HR: .25; 2 INJECTION, POWDER, LYOPHILIZED, FOR SOLUTION INTRAVENOUS at 20:19

## 2022-04-25 RX ADMIN — PIPERACILLIN SODIUM AND TAZOBACTAM SODIUM SCH MLS/HR: .25; 2 INJECTION, POWDER, LYOPHILIZED, FOR SOLUTION INTRAVENOUS at 04:57

## 2022-04-25 NOTE — NUR
rn notes 

 There is a large left pleural effusion and a moderate right pleural effusion which have 
increased compared to the prior CT. There is patchy opacification throughout the left lung 
with near complete collapse of the left lower lobe and partial collapse of the left upper 
lobe most likely secondary to

atelectasis rather than pneumonia, although, superimposed infection is not excluded. There 
is compressive atelectasis in the right lower lobe. Findings have progressed compared to the 
prior CT. There is no pneumothorax. The central airways and trachea are patent. Seen patient 
 via hospitalist  Dr Luna , get to order to stop iv infusion.  plan is today 
thoracentesis.  patient tolerated  breakfast well with assist, on o2-3lNC. Due medication 
administered,   assist turn and reposition q 2 hr.  no output  from briggs at this time. will 
follow up.

## 2022-04-25 NOTE — NUR
RN NOTES 

PATIENT SCHEDULED LEFT  THORACENTESIS, AND MRI OF PELVIC WITH CONTRAST, PATIENT UNABLE TO 
SIGN CONSENT FORM, BUT VERBALLY AGREES FOR PROCEDURE. COSIGNED WITH CHARGE NURSE KAYLA

## 2022-04-25 NOTE — NUR
RN  NOTES 

PATIENT TRANSFERRED TO THE TELE UNIT ROOM 103 WITH STABLE CONDITION, PATIENT ON O2-3LNC 
,VSS. BEDSIDE REPORT GIVEN GRACIE ANDERSON FOLLOW PLAN OF CARE. ANSWERED ALL QUESTIONS.

## 2022-04-25 NOTE — NUR
TELE RN NOTES



PT BS 83 NO INSULIN COVERAGE GIVEN AS PT IS TO BE NPO AFTER MIDNIGHT.SNACKS AND JUICE 
PROVIDED.

## 2022-04-25 NOTE — NUR
RN NOTES:



PT'S BLOOD SUGAR 120. NO COVERAGE NEEDED.  NO S/S OF HYPER/HYPOGLYCEMIA. WILL CONTINUE TO 
MONITOR.

## 2022-04-25 NOTE — NUR
RN OPENING NOTE 



PATIENT RESTING IN BED, ON NC 2L. A/O X 2-3. NO CURRENT COMPLAINTS OF SOB, OR PAIN. IV 
ACCESS NOTED ON SAMARA MIDLINE AND L WRIST 20g ALSO RIGHT JUGULAR HD CATH. ALL CLEAN WITH NO 
SINGS OF INFECTION OR INFILTRATION. PATIENT HAD THORACENTESIS EARLIER TODAY WITH 700CC 
OUTPUT. SAFETY MEASURES IN PLACE, BED ARM ACTIVATED, 2 SIDE RAILS UP BED LOCKED IN THE 
LOWEST POSITION. CALL LIGHT WITHIN REACH. ALL NEEDS MET AT THIS ITME. WILL CONTINUE TO 
MONITOR.

## 2022-04-25 NOTE — NUR
ICU RN CLOSING NOTES:



PT IS IN BED, SLEEPING BUT EASILY AROUSABLE, A/OX 3-4, VERBALLY RESPONSIVE. ON O2 AT 3L/MIN 
VIA N/C, O2 SAT 95% AND PT TOLERATED WELL. IV ACCESS SAMARA MIDLINE, RUNNING D5NS 125CC/HR. LT 
WRIST #20G INTACT AND PATENT. RIJ HD CATH INTACT AND PATENT AND COVERED WITH DRY DRESSING. 
NO BLEEDING NOTED. NO C/O PAIN OR DISCOMFORT. NO ACUTE DISTRESS. RICHMOND CATHETER INTACT WITH 
YELLOWISH/CLEAR URINE. 130CC OF URINE NOTED. ALL DUE MEDS GIVEN AS ORDERED. ALL SAFETY 
MEASURES IN PLACE. BED IN LOWEST POSITION AND LOCKED. SIDE RAILS UP X3, PLACE CALL LIGHT 
WITH IN REACH. WILL ENDORSE TO MORNING SHIFT NURSE.

## 2022-04-25 NOTE — NUR
RN CLOSING NOTE 



PATIENT RESTING IN BED, ON NC 2L. A/O X 2. NO CURRENT COMPLAINTS OF SOB, OR PAIN. IV ACCESS 
NOTED ON SAMARA MIDLINE AND L WRIST 20g ALSO RIGHT JUGULAR HD CATH. ALL CLEAN WITH NO SINGS OF 
INFECTION OR INFILTRATION. PATIENT HAD THORACENTESIS EARLIER TODAY WITH 700CC OUTPUT. SAFETY 
MEASURES IN PLACE, BED ARM ACTIVATED, 2 SIDE RAILS UP BED LOCKED IN THE LOWEST POSITION. 
CALL LIGHT WITHIN REACH. WILL ENDORSE TO NIGHT NURSE FOR YADI.

## 2022-04-25 NOTE — NUR
RN NOTE 



RECEIVED PATIENT FROM ICU, PLACED IN ROOM 103 IN STABLE CONDITION PATIENT IS CURRENTLY 
UNDERGOING THORACENTESIS, ULTRASOUND TECH AT BEDSIDE.

## 2022-04-26 VITALS — DIASTOLIC BLOOD PRESSURE: 45 MMHG | SYSTOLIC BLOOD PRESSURE: 90 MMHG

## 2022-04-26 VITALS — DIASTOLIC BLOOD PRESSURE: 50 MMHG | SYSTOLIC BLOOD PRESSURE: 90 MMHG

## 2022-04-26 VITALS — DIASTOLIC BLOOD PRESSURE: 79 MMHG | SYSTOLIC BLOOD PRESSURE: 143 MMHG

## 2022-04-26 VITALS — SYSTOLIC BLOOD PRESSURE: 100 MMHG | DIASTOLIC BLOOD PRESSURE: 61 MMHG

## 2022-04-26 VITALS — DIASTOLIC BLOOD PRESSURE: 86 MMHG | SYSTOLIC BLOOD PRESSURE: 157 MMHG

## 2022-04-26 VITALS — SYSTOLIC BLOOD PRESSURE: 93 MMHG | DIASTOLIC BLOOD PRESSURE: 52 MMHG

## 2022-04-26 LAB
ALBUMIN/GLOB SERPL: 0.6 {RATIO} (ref 0.7–1.7)
ALPHA1 GLOB SERPL ELPH-MCNC: 0.5 G/DL (ref 0–0.4)
ALPHA2 GLOB SERPL ELPH-MCNC: 0.9 G/DL (ref 0.4–1)
B-GLOBULIN SERPL ELPH-MCNC: 0.8 G/DL (ref 0.7–1.3)
BASOPHILS # BLD AUTO: 0 K/UL (ref 0–0.2)
BASOPHILS NFR BLD AUTO: 0.1 % (ref 0–2)
BUN SERPL-MCNC: 28 MG/DL (ref 7–18)
CALCIUM SERPL-MCNC: 8.1 MG/DL (ref 8.5–10.1)
CHLORIDE SERPL-SCNC: 107 MMOL/L (ref 98–107)
CO2 SERPL-SCNC: 15 MMOL/L (ref 21–32)
CREAT SERPL-MCNC: 4.2 MG/DL (ref 0.6–1.3)
EOSINOPHIL NFR BLD AUTO: 0.8 % (ref 0–6)
GLUCOSE SERPL-MCNC: 107 MG/DL (ref 74–106)
HCT VFR BLD AUTO: 37 % (ref 33–45)
HGB BLD-MCNC: 11.1 G/DL (ref 11.5–14.8)
LYMPHOCYTES NFR BLD AUTO: 0.6 K/UL (ref 0.8–4.8)
LYMPHOCYTES NFR BLD AUTO: 4.8 % (ref 20–44)
MAGNESIUM SERPL-MCNC: 2 MG/DL (ref 1.8–2.4)
MCHC RBC AUTO-ENTMCNC: 30 G/DL (ref 31–36)
MCV RBC AUTO: 82 FL (ref 82–100)
MONOCYTES NFR BLD AUTO: 1.7 K/UL (ref 0.1–1.3)
MONOCYTES NFR BLD AUTO: 13.2 % (ref 2–12)
NEUTROPHILS # BLD AUTO: 10.3 K/UL (ref 1.8–8.9)
NEUTROPHILS NFR BLD AUTO: 81.1 % (ref 43–81)
PHOSPHATE SERPL-MCNC: 7.7 MG/DL (ref 2.5–4.9)
PLATELET # BLD AUTO: 213 K/UL (ref 150–450)
POTASSIUM SERPL-SCNC: 4 MMOL/L (ref 3.5–5.1)
RBC # BLD AUTO: 4.55 MIL/UL (ref 4–5.2)
SODIUM SERPL-SCNC: 134 MMOL/L (ref 136–145)
WBC NRBC COR # BLD AUTO: 12.8 K/UL (ref 4.3–11)

## 2022-04-26 RX ADMIN — CLOTRIMAZOLE SCH GM: 1 CREAM TOPICAL at 09:25

## 2022-04-26 RX ADMIN — CLOTRIMAZOLE SCH GM: 1 CREAM TOPICAL at 16:10

## 2022-04-26 RX ADMIN — Medication SCH EACH: at 23:01

## 2022-04-26 RX ADMIN — INSULIN HUMAN PRN UNITS: 100 INJECTION, SOLUTION PARENTERAL at 11:27

## 2022-04-26 RX ADMIN — PIPERACILLIN SODIUM AND TAZOBACTAM SODIUM SCH MLS/HR: .25; 2 INJECTION, POWDER, LYOPHILIZED, FOR SOLUTION INTRAVENOUS at 20:28

## 2022-04-26 RX ADMIN — PANTOPRAZOLE SODIUM SCH MG: 40 TABLET, DELAYED RELEASE ORAL at 08:29

## 2022-04-26 RX ADMIN — Medication SCH EACH: at 05:46

## 2022-04-26 RX ADMIN — Medication SCH EACH: at 17:27

## 2022-04-26 RX ADMIN — INSULIN GLARGINE SCH UNIT: 100 INJECTION, SOLUTION SUBCUTANEOUS at 22:00

## 2022-04-26 RX ADMIN — INSULIN HUMAN PRN UNITS: 100 INJECTION, SOLUTION PARENTERAL at 05:46

## 2022-04-26 RX ADMIN — Medication SCH OZ: at 09:25

## 2022-04-26 RX ADMIN — MINERAL SUPPLEMENT IRON 300 MG / 5 ML STRENGTH LIQUID 100 PER BOX UNFLAVORED SCH MG: at 08:29

## 2022-04-26 RX ADMIN — Medication SCH EACH: at 11:14

## 2022-04-26 RX ADMIN — PIPERACILLIN SODIUM AND TAZOBACTAM SODIUM SCH MLS/HR: .25; 2 INJECTION, POWDER, LYOPHILIZED, FOR SOLUTION INTRAVENOUS at 04:29

## 2022-04-26 RX ADMIN — PIPERACILLIN SODIUM AND TAZOBACTAM SODIUM SCH MLS/HR: .25; 2 INJECTION, POWDER, LYOPHILIZED, FOR SOLUTION INTRAVENOUS at 12:34

## 2022-04-26 NOTE — NUR
RN CLOSING NOTE 



PATIENT RESTING IN BED, ON NC 2L. A/O X 2. NO CURRENT COMPLAINTS OF SOB, OR PAIN. IV ACCESS 
NOTED ON SAMARA MIDLINE AND L WRIST 20g ALSO RIGHT JUGULAR HD CATH. ALL CLEAN WITH NO SINGS OF 
INFECTION OR INFILTRATION. DISCHARGE PAPERWORK DONE, WAITING FOR TRANSPORTATION TO 4 
[facility].  SAFETY MEASURES IN PLACE, BED ARM ACTIVATED, 2 SIDE RAILS UP BED LOCKED IN THE 
LOWEST POSITION. CALL LIGHT WITHIN REACH. ALL NEEDS MET AT THIS TIME. WILL ENDORSE CARE TO 
NIGHT SHIFT NURSE.

## 2022-04-26 NOTE — NUR
WOUND CARE CONSULT: PT PRESENTS WITH SOME REDNESS TO PERINEUM AND GLUTEAL CREASE/LOWER 
BUTTOCKS. RECOMMENDATIONS MADE FOR SKIN PROTECTION. DISCUSSED WITH NURSING STAFF. FIRST STEP 
LOW AIRLOSS MATTRESS IS ORDERED. MD IN AGREEMENT WITH PLAN OF CARE.

## 2022-04-26 NOTE — NUR
RN NOTES:



PT CONSTANTLY REMOVE HER N/C. REMOVED IJ HD CATH. TRYING TO REMOVE THE IV LINE. NOTIFIED JERSON LINDSAY. ORDER- ACUTE BILATERAL WRIST SOFT RESTRAINT. ORDER NOTED AND CARRIED OUT.

## 2022-04-26 NOTE — NUR
RN OPENING NOTES:



RECEIVED PATIENT IN BED, AWAKE, ALERT/ORIENTED X2-3, RESPONSIVE TO PAINFUL STIMULI. ON O2 
2L/MIN VIA N/C AND TOLERATED WELL. BREATHING EVEN AND UNLABORED.  IV ACCESS NOTED ON SAMARA 
MIDLINE AND L WRIST #20G. INTACT AND PATENT. NO S/S OF INFILTRATIONS. NOTED PT REMOVED HER  
RIGHT JUGULAR HD CATH. FOUND ON THE TOP OF THE BED. NO ACTIVE BLEEDING NOTED FROM THE SITE. 
APPLIED PRESSURE AND COVERED WITH DRY DRESSING. NOTIFIED DR. ARTHUR. NO C/O PAIN OR 
DISCOMFORT. NO ACUTE DISTRESS. RICHMOND CATHETER INTACT AND PATENT WITH YELLOWISH/ CLEAR URINE. 
ALL SAFETY MEASURES IN PLACE, BED ARM ON. SIDE RAILS UP X3, BED IN THE LOWEST POSITION AND 
LOCKED. PLACE CALL LIGHT WITHIN REACH.  WILL CONTINUE TO MONITOR.

## 2022-04-26 NOTE — NUR
RN NOTES:



PT BLOOD SUGAR TRENDING TO 70-80. TONIGHT . NOT IV FLUID. DOESN'T EAT MUCH. LANTUS 24 
UNITS ON HOLD PER DR. ARTHUR'S ORDER. NO S/S OF HYPER/HYPOGLYCEMIA. WILL CONTINUE TO MONITOR

## 2022-04-26 NOTE — NUR
RN OPENING NOTE 



PATIENT RESTING IN BED, ON NC 2L. A/O X 2-3. NO CURRENT COMPLAINTS OF SOB, OR PAIN. IV 
ACCESS NOTED ON SAMARA MIDLINE AND L WRIST 20g ALSO RIGHT JUGULAR HD CATH. ALL CLEAN WITH NO 
SINGS OF INFECTION OR INFILTRATION.  SAFETY MEASURES IN PLACE, BED ARM ACTIVATED, 2 SIDE 
RAILS UP BED LOCKED IN THE LOWEST POSITION. CALL LIGHT WITHIN REACH.  WILL CONTINUE TO 
MONITOR THROUGHOUT SHIFT.

## 2022-04-26 NOTE — NUR
1945 PATIENT PULLED OUT HER RIGHT IJ HEMODIALYSIS CATHETER.  NO BLEEDING/HEMATOMA AT SITE 
NOTED BUT STILL REINFORCED WITH DRESSING.  PATIENT DECLINED TO SAY WHY SHE PULLED IT OUT.  
NP JANETTE MADE AWARE WITH ORDER TO DO CHEST XRAY.  ORDER NOTED, RADIOLOGY NOTIFIED C/O FABI.

## 2022-04-26 NOTE — NUR
RN CLOSING NOTE 



PATIENT RESTING IN BED, ON NC 2L. A/O X 2-3. NO CURRENT COMPLAINTS OF SOB, OR PAIN. IV 
ACCESS NOTED ON SAMARA MIDLINE AND L WRIST 20g ALSO RIGHT JUGULAR HD CATH. ALL CLEAN WITH NO 
SINGS OF INFECTION OR INFILTRATION. PATIENT PT NPO SINCE MIDNIGHT FOR MRI  SAFETY MEASURES 
IN PLACE, BED ARM ACTIVATED, 2 SIDE RAILS UP BED LOCKED IN THE LOWEST POSITION. CALL LIGHT 
WITHIN REACH. ALL NEEDS MET AT THIS TIME. WILL ENDORSE CARE.

## 2022-04-26 NOTE — NUR
RN NOTES:



JERSON LINDSAY CALLED BACK WITH AN ORDER FOR CHEST X-RAY DUE PT PULLED OUT THE IJ HD 
CATHETER. ORDER NOTED AND CARRIED OUT. WILL CONTINUE TO MONITOR

## 2022-04-26 NOTE — NUR
TELE RN NOTES





PT NOTED WILL SACRAL EXCORIATION AND PERINEAL SWELLING AND REDNESS PHOTOS TAKEN AND PLACED 
IN CHART. Z GUARD APPLIED TO PERINEAL AREA AND SACRAL AREA CLEANED WITH NS PAT DRY AND 
MEPILEX APPLIED TO SITE. WILL ORDER WOUND CARE CONSULT.

## 2022-04-27 VITALS — SYSTOLIC BLOOD PRESSURE: 151 MMHG | DIASTOLIC BLOOD PRESSURE: 73 MMHG

## 2022-04-27 VITALS — SYSTOLIC BLOOD PRESSURE: 128 MMHG | DIASTOLIC BLOOD PRESSURE: 65 MMHG

## 2022-04-27 VITALS — SYSTOLIC BLOOD PRESSURE: 125 MMHG | DIASTOLIC BLOOD PRESSURE: 73 MMHG

## 2022-04-27 VITALS — DIASTOLIC BLOOD PRESSURE: 80 MMHG | SYSTOLIC BLOOD PRESSURE: 142 MMHG

## 2022-04-27 VITALS — DIASTOLIC BLOOD PRESSURE: 63 MMHG | SYSTOLIC BLOOD PRESSURE: 118 MMHG

## 2022-04-27 VITALS — DIASTOLIC BLOOD PRESSURE: 76 MMHG | SYSTOLIC BLOOD PRESSURE: 137 MMHG

## 2022-04-27 LAB
BUN SERPL-MCNC: 23 MG/DL (ref 7–18)
CALCIUM SERPL-MCNC: 8.3 MG/DL (ref 8.5–10.1)
CHLORIDE SERPL-SCNC: 105 MMOL/L (ref 98–107)
CO2 SERPL-SCNC: 23 MMOL/L (ref 21–32)
CREAT SERPL-MCNC: 3.3 MG/DL (ref 0.6–1.3)
GLUCOSE SERPL-MCNC: 102 MG/DL (ref 74–106)
POTASSIUM SERPL-SCNC: 3.5 MMOL/L (ref 3.5–5.1)
SODIUM SERPL-SCNC: 139 MMOL/L (ref 136–145)

## 2022-04-27 RX ADMIN — INSULIN HUMAN PRN UNITS: 100 INJECTION, SOLUTION PARENTERAL at 17:59

## 2022-04-27 RX ADMIN — Medication SCH OZ: at 08:17

## 2022-04-27 RX ADMIN — INSULIN HUMAN PRN UNITS: 100 INJECTION, SOLUTION PARENTERAL at 23:28

## 2022-04-27 RX ADMIN — Medication SCH EACH: at 23:28

## 2022-04-27 RX ADMIN — PIPERACILLIN SODIUM AND TAZOBACTAM SODIUM SCH MLS/HR: .25; 2 INJECTION, POWDER, LYOPHILIZED, FOR SOLUTION INTRAVENOUS at 05:09

## 2022-04-27 RX ADMIN — Medication SCH EACH: at 11:10

## 2022-04-27 RX ADMIN — INSULIN HUMAN PRN UNITS: 100 INJECTION, SOLUTION PARENTERAL at 11:10

## 2022-04-27 RX ADMIN — PIPERACILLIN SODIUM AND TAZOBACTAM SODIUM SCH MLS/HR: .25; 2 INJECTION, POWDER, LYOPHILIZED, FOR SOLUTION INTRAVENOUS at 20:32

## 2022-04-27 RX ADMIN — Medication SCH EACH: at 17:02

## 2022-04-27 RX ADMIN — MINERAL SUPPLEMENT IRON 300 MG / 5 ML STRENGTH LIQUID 100 PER BOX UNFLAVORED SCH MG: at 08:16

## 2022-04-27 RX ADMIN — CLOTRIMAZOLE SCH GM: 1 CREAM TOPICAL at 16:30

## 2022-04-27 RX ADMIN — PIPERACILLIN SODIUM AND TAZOBACTAM SODIUM SCH MLS/HR: .25; 2 INJECTION, POWDER, LYOPHILIZED, FOR SOLUTION INTRAVENOUS at 12:06

## 2022-04-27 RX ADMIN — Medication SCH EACH: at 05:30

## 2022-04-27 RX ADMIN — CLOTRIMAZOLE SCH GM: 1 CREAM TOPICAL at 08:17

## 2022-04-27 RX ADMIN — INSULIN GLARGINE SCH UNIT: 100 INJECTION, SOLUTION SUBCUTANEOUS at 21:50

## 2022-04-27 RX ADMIN — PANTOPRAZOLE SODIUM SCH MG: 40 TABLET, DELAYED RELEASE ORAL at 08:16

## 2022-04-27 NOTE — NUR
RN OPENING NOTES



RECEIVED PATIENT IN BED, ASLEEP, ALERT/ORIENTED X2,  ON O2 2L/MIN VIA N/C AND TOLERATED 
WELL. BREATHING EVEN AND UNLABORED.  IV ACCESS NOTED ON SAMARA MIDLINE AND L WRIST #20G. INTACT 
AND PATENT. NO S/S OF INFILTRATIONS. NOTED PT REMOVED HER  RIGHT JUGULAR HD CATH. FOUND ON 
THE TOP OF THE BED. NO ACTIVE BLEEDING NOTED FROM THE SITE.  NO C/O PAIN OR DISCOMFORT. NO 
ACUTE DISTRESS. RICHMOND CATHETER INTACT AND PATENT WITH YELLOWISH/ CLEAR URINE. ALL SAFETY 
MEASURES IN PLACE, BED ARM ON. SIDE RAILS UP X3, BED IN THE LOWEST POSITION AND LOCKED. 
PLACE CALL LIGHT WITHIN REACH.  WILL CONTINUE TO MONITOR.

## 2022-04-27 NOTE — NUR
RN CLOSING NOTES



PATIENT IN BED ASLEEP RESTING, ALERT/ORIENTED X 2, , VERBALLY RESPONSIVE. ON O2 2L/MIN VIA 
N/C, O2 SAT 95%. BREATHING EVEN AND UNLABORED.  IV ACCESS NOTED ON SAMARA MIDLINE AND L WRIST 
#20G. INTACT AND PATENT. NO S/S OF INFILTRATIONS. NO C/O PAIN OR DISCOMFORT. NO ACUTE 
DISTRESS. R[employer]HETER INTACT AND PATENT WITH YELLOWISH/ CLEAR URINE. 500CC OUTPUT. ALL 
DUE MEDS GIVEN AS ORDERED.  PT WILL BE NPO AFTER MIDNIGHT FOR PERMA CATH INSERTION.  CONSENT 
SIGNED, BMP ORDERED FOR 0500. ALL SAFETY MEASURES IN PLACE, BED ARM ON. SIDE RAILS UP X3, 
BED IN THE LOWEST POSITION AND LOCKED. PLACE CALL LIGHT WITHIN REACH.  WILL ENDORSE TO NIGHT 
SHIFT NURSE.

## 2022-04-27 NOTE — NUR
RN opening notes

Received Pt in bed resting comfortably. Pt is alert and orientedX1 with episode of 
confusion. On 2 L NC. No SOB. No S/S of distress noted. Tele monitor showed SR hr at 89. SAMARA 
midline is intact and clean. L wrist # 20 is clean, intact and SL. briggs cath is in placed 
and draining yellow urine. Bilateral soft wrist restraint is in placed. skin is warm to 
touch and circulation is check Q 2hr. Safety precautions is maintained. Bed at low position, 
brakes locked, side rails upX3, hob elevated and call light is within reach. Will continue 
to monitor.

## 2022-04-27 NOTE — NUR
RN CLOSING NOTES:



PATIENT IN BED, AWAKE, ALERT/ORIENTED X2-3, VERBALLY RESPONSIVE. ON O2 2L/MIN VIA N/C, O2 
SAT 93% AND TOLERATED WELL. BREATHING EVEN AND UNLABORED.  IV ACCESS NOTED ON SAMARA MIDLINE 
AND L WRIST #20G. INTACT AND PATENT. NO S/S OF INFILTRATIONS. BLOOD SUGAR IN THE MORNING 89, 
NO COVERAGE NEEDED. NO S/S OF HYPER/HYPOGLYCEMIA. NO C/O PAIN OR DISCOMFORT. NO ACUTE 
DISTRESS. R[employer] CATHETER INTACT AND PATENT WITH YELLOWISH/ CLEAR URINE. ONLY 125CC OUTPUT. 
ALL DUE MEDS GIVEN AS ORDERED. ALL SAFETY MEASURES IN PLACE, BED ARM ON. SIDE RAILS UP X3, 
BED IN THE LOWEST POSITION AND LOCKED. PLACE CALL LIGHT WITHIN REACH.  WILL ENDORSE TO 
MORNING SHIFT NURSE.

## 2022-04-27 NOTE — NUR
RN NOTES



CONSENT FOR PERMA CATH PLACEMENT SIGNED, BMP ORDERED FOR 0500 ON 4/28/22, WILL PUT PT ON NPO 
PAST MIDNIGHT.

## 2022-04-28 VITALS — SYSTOLIC BLOOD PRESSURE: 146 MMHG | DIASTOLIC BLOOD PRESSURE: 79 MMHG

## 2022-04-28 VITALS — SYSTOLIC BLOOD PRESSURE: 150 MMHG | DIASTOLIC BLOOD PRESSURE: 77 MMHG

## 2022-04-28 VITALS — DIASTOLIC BLOOD PRESSURE: 79 MMHG | SYSTOLIC BLOOD PRESSURE: 122 MMHG

## 2022-04-28 VITALS — SYSTOLIC BLOOD PRESSURE: 153 MMHG | DIASTOLIC BLOOD PRESSURE: 85 MMHG

## 2022-04-28 VITALS — DIASTOLIC BLOOD PRESSURE: 81 MMHG | SYSTOLIC BLOOD PRESSURE: 130 MMHG

## 2022-04-28 VITALS — SYSTOLIC BLOOD PRESSURE: 147 MMHG | DIASTOLIC BLOOD PRESSURE: 76 MMHG

## 2022-04-28 LAB
BASOPHILS # BLD AUTO: 0 K/UL (ref 0–0.2)
BASOPHILS NFR BLD AUTO: 0.6 % (ref 0–2)
BUN SERPL-MCNC: 30 MG/DL (ref 7–18)
CALCIUM SERPL-MCNC: 8.2 MG/DL (ref 8.5–10.1)
CHLORIDE SERPL-SCNC: 103 MMOL/L (ref 98–107)
CO2 SERPL-SCNC: 22 MMOL/L (ref 21–32)
CREAT SERPL-MCNC: 4 MG/DL (ref 0.6–1.3)
EOSINOPHIL NFR BLD AUTO: 2.4 % (ref 0–6)
GLUCOSE SERPL-MCNC: 151 MG/DL (ref 74–106)
HCT VFR BLD AUTO: 31 % (ref 33–45)
HGB BLD-MCNC: 9.6 G/DL (ref 11.5–14.8)
LYMPHOCYTES NFR BLD AUTO: 0.6 K/UL (ref 0.8–4.8)
LYMPHOCYTES NFR BLD AUTO: 8.6 % (ref 20–44)
MAGNESIUM SERPL-MCNC: 2.2 MG/DL (ref 1.8–2.4)
MCHC RBC AUTO-ENTMCNC: 31 G/DL (ref 31–36)
MCV RBC AUTO: 77 FL (ref 82–100)
MONOCYTES NFR BLD AUTO: 0.7 K/UL (ref 0.1–1.3)
MONOCYTES NFR BLD AUTO: 10 % (ref 2–12)
NEUTROPHILS # BLD AUTO: 5.7 K/UL (ref 1.8–8.9)
NEUTROPHILS NFR BLD AUTO: 78.4 % (ref 43–81)
PHOSPHATE SERPL-MCNC: 6.3 MG/DL (ref 2.5–4.9)
PLATELET # BLD AUTO: 250 K/UL (ref 150–450)
POTASSIUM SERPL-SCNC: 3.9 MMOL/L (ref 3.5–5.1)
RBC # BLD AUTO: 4.02 MIL/UL (ref 4–5.2)
SODIUM SERPL-SCNC: 136 MMOL/L (ref 136–145)
WBC NRBC COR # BLD AUTO: 7.3 K/UL (ref 4.3–11)

## 2022-04-28 PROCEDURE — 0JHD3XZ INSERTION OF TUNNELED VASCULAR ACCESS DEVICE INTO RIGHT UPPER ARM SUBCUTANEOUS TISSUE AND FASCIA, PERCUTANEOUS APPROACH: ICD-10-PCS | Performed by: SURGERY

## 2022-04-28 PROCEDURE — 05HM33Z INSERTION OF INFUSION DEVICE INTO RIGHT INTERNAL JUGULAR VEIN, PERCUTANEOUS APPROACH: ICD-10-PCS | Performed by: SURGERY

## 2022-04-28 PROCEDURE — B5131ZA FLUOROSCOPY OF RIGHT JUGULAR VEINS USING LOW OSMOLAR CONTRAST, GUIDANCE: ICD-10-PCS | Performed by: SURGERY

## 2022-04-28 RX ADMIN — Medication SCH EACH: at 12:10

## 2022-04-28 RX ADMIN — PIPERACILLIN SODIUM AND TAZOBACTAM SODIUM SCH MLS/HR: .25; 2 INJECTION, POWDER, LYOPHILIZED, FOR SOLUTION INTRAVENOUS at 04:01

## 2022-04-28 RX ADMIN — MINERAL SUPPLEMENT IRON 300 MG / 5 ML STRENGTH LIQUID 100 PER BOX UNFLAVORED SCH MG: at 08:16

## 2022-04-28 RX ADMIN — CLOTRIMAZOLE SCH GM: 1 CREAM TOPICAL at 09:21

## 2022-04-28 RX ADMIN — DEXTROSE MONOHYDRATE PRN MLS/HR: 50 INJECTION, SOLUTION INTRAVENOUS at 22:10

## 2022-04-28 RX ADMIN — Medication SCH OZ: at 09:21

## 2022-04-28 RX ADMIN — INSULIN HUMAN PRN UNITS: 100 INJECTION, SOLUTION PARENTERAL at 05:13

## 2022-04-28 RX ADMIN — INSULIN GLARGINE SCH UNIT: 100 INJECTION, SOLUTION SUBCUTANEOUS at 21:57

## 2022-04-28 RX ADMIN — PIPERACILLIN SODIUM AND TAZOBACTAM SODIUM SCH MLS/HR: .25; 2 INJECTION, POWDER, LYOPHILIZED, FOR SOLUTION INTRAVENOUS at 12:28

## 2022-04-28 RX ADMIN — PANTOPRAZOLE SODIUM SCH MG: 40 TABLET, DELAYED RELEASE ORAL at 08:16

## 2022-04-28 RX ADMIN — CLOTRIMAZOLE SCH GM: 1 CREAM TOPICAL at 16:08

## 2022-04-28 RX ADMIN — Medication SCH EACH: at 18:47

## 2022-04-28 RX ADMIN — Medication SCH EACH: at 05:13

## 2022-04-28 RX ADMIN — PIPERACILLIN SODIUM AND TAZOBACTAM SODIUM SCH MLS/HR: .25; 2 INJECTION, POWDER, LYOPHILIZED, FOR SOLUTION INTRAVENOUS at 21:14

## 2022-04-28 NOTE — NUR
her father called in at this time cell # [phone] home phone is  his name is 
NINA RODRIGUEZ for his contact number

## 2022-04-28 NOTE — NUR
RN OPENING NOTES



RECEIVED PATIENT IN BED, ASLEEP, ALERT/ORIENTED X1-2 WITH PERIODS OF CONFUSION,  ON O2 
2L/MIN VIA N/C AND TOLERATING WELL WITH SATURATION %. BREATHING EVEN AND UNLABORED. NO 
SIGNS OF ACUTE DISTRESS NOTED AT THE TIME.  IV ACCESS NOTED ON SAMARA MIDLINE AND L WRIST #20G. 
INTACT AND PATENT. NO S/S OF INFILTRATIONS. NO ACTIVE BLEEDING NOTED FROM RIGHT IJ HD CATH 
SITE S/P PATIENT PULLED OUT CATH. PATIENT ON NPO STATUS FOR PERMACATH INSERTION TODAY. NO 
C/O PAIN OR DISCOMFORT. RICHMOND CATHETER INTACT AND PATENT WITH YELLOWISH CLEAR URINE. ALL 
SAFETY MEASURES IN PLACE, BED ALARM ON. SIDE RAILS UP X3, BED IN THE LOWEST POSITION AND 
LOCKED. PLACE CALL LIGHT WITHIN REACH.  WILL CONTINUE TO MONITOR AND ASSESS PATIENT 
THROUGHOUT SHIFT.

## 2022-04-28 NOTE — NUR
RN NOTE



PATIENT LEFT UNIT WITH OR STAFF FOR PROCEDURE OF PERMA CATH PLACEMENT. BREATHING EVEN AND 
UNLABORED. NO SOB NOTED DURING TRANSFER. NO ACUTE DISTRESS NOTED DURING TRANSFER.

## 2022-04-28 NOTE — NUR
RN NOTE



RECEIVED PATIENT IN BED RESTING ALERT ORIENTEDX1-2 VERBALLY RESPONSIVE ON 3L OXYGEN VIA 
NASAL CANNULA,O2:95% IV SITE IS ON RIGHT UPPER ARM MIDLINE INTACT PATENT,HD CATH ON RIGHT 
UPPER CHEST INTACT.PATIENT IS ON HD NOW,RICHMOND CATHETER IN PLACE URINE DRAINING BY 
GRAVITY,SOFT BILATERAL WRIST RESTRAIN IN PLACE WILL CHECK EVERY 15 MINS FOR SKIN 
BREAKDOWN,AND CIRCULATION,SAFETY MEASURE IMPLEMENT BED IN LOW POSITION AND LOCKED HEAD OF 
THE BED ELEVATED,CONTINUE TO MONITOR.

## 2022-04-28 NOTE — NUR
RN NOTE



PATIENT BACK IN UNIT S/P PERMACATH PLACEMENT ON RIGHT CHEST WALL. SITE WITH DRESSING INTACT, 
CLEAN AND DRY. VITAL SIGNS= BP-146/78, HR-81,O2-93%, RR-16, TEMP 96.8, BLOOD SUGAR 123. NO 
ACUTE DISTRESS NOTED AT THE TIME. BREATHING EVEN AND UNLABORED. NEW ORDERS NOTED PER MALATHI LOPEZ TO USE LINE AND TO RESUME DIET. ALL NEEDS ANTICIPATED. ALL SAFETY MEASURES IN 
PLACE.

## 2022-04-28 NOTE — NUR
RN NOTE



PATIENT'S SPECIAL MATTRESS WAS DROPPED OFF TO UNIT, PATIENT STILL NOT BACK TO UNIT, UNABLE 
TO PUT SPECIAL MATTRESS FOR PATIENT.

## 2022-04-28 NOTE — NUR
RN closing notes

Pt is resting in bed comfortably. Pt is alert and orientedX1 with episode of confusion. On 2 
L NC. No SOB. No S/S of distress noted. VS is stable. Tele monitor showed SR hr at 84. 
Routine meds were given as ordered.  Kept Pt NPO. SAMARA midline is intact and clean. L wrist # 
20 is clean, intact and SL. briggs cath is in placed and draining yellow urine 450ml. 
Bilateral soft wrist restraint is in placed. skin is warm to touch and circulation is check 
Q 2hr. Kept Pt clean, dry and comfortable. Safety precautions is maintained. Bed at low 
position, brakes locked, side rails upX3, hob elevated and call light is within reach. Will 
endorse to am nurse for YADI.

## 2022-04-28 NOTE — NUR
RN NOTES



PATIENT HAS RESULT  MG/DL FOR BLOOD SUGAR, NO INSULIN COVERAGE NEEDED. NO SIGNS OF 
HYPER/HYPOGLYCEMIA NOTED AT THE TIME. ALL SAFETY MEASURES IN PLACE.

## 2022-04-29 VITALS — SYSTOLIC BLOOD PRESSURE: 128 MMHG | DIASTOLIC BLOOD PRESSURE: 72 MMHG

## 2022-04-29 VITALS — SYSTOLIC BLOOD PRESSURE: 125 MMHG | DIASTOLIC BLOOD PRESSURE: 75 MMHG

## 2022-04-29 VITALS — SYSTOLIC BLOOD PRESSURE: 146 MMHG | DIASTOLIC BLOOD PRESSURE: 79 MMHG

## 2022-04-29 VITALS — SYSTOLIC BLOOD PRESSURE: 122 MMHG | DIASTOLIC BLOOD PRESSURE: 62 MMHG

## 2022-04-29 VITALS — DIASTOLIC BLOOD PRESSURE: 83 MMHG | SYSTOLIC BLOOD PRESSURE: 150 MMHG

## 2022-04-29 VITALS — DIASTOLIC BLOOD PRESSURE: 74 MMHG | SYSTOLIC BLOOD PRESSURE: 122 MMHG

## 2022-04-29 LAB
BASOPHILS # BLD AUTO: 0.1 K/UL (ref 0–0.2)
BASOPHILS NFR BLD AUTO: 0.7 % (ref 0–2)
BUN SERPL-MCNC: 19 MG/DL (ref 7–18)
CALCIUM SERPL-MCNC: 8 MG/DL (ref 8.5–10.1)
CHLORIDE SERPL-SCNC: 101 MMOL/L (ref 98–107)
CO2 SERPL-SCNC: 26 MMOL/L (ref 21–32)
CREAT SERPL-MCNC: 3 MG/DL (ref 0.6–1.3)
EOSINOPHIL NFR BLD AUTO: 2.7 % (ref 0–6)
GLUCOSE SERPL-MCNC: 142 MG/DL (ref 74–106)
HCT VFR BLD AUTO: 32 % (ref 33–45)
HGB BLD-MCNC: 9.9 G/DL (ref 11.5–14.8)
LYMPHOCYTES NFR BLD AUTO: 0.6 K/UL (ref 0.8–4.8)
LYMPHOCYTES NFR BLD AUTO: 7.9 % (ref 20–44)
MAGNESIUM SERPL-MCNC: 2 MG/DL (ref 1.8–2.4)
MCHC RBC AUTO-ENTMCNC: 31 G/DL (ref 31–36)
MCV RBC AUTO: 77 FL (ref 82–100)
MONOCYTES NFR BLD AUTO: 0.8 K/UL (ref 0.1–1.3)
MONOCYTES NFR BLD AUTO: 10.7 % (ref 2–12)
NEUTROPHILS # BLD AUTO: 6.2 K/UL (ref 1.8–8.9)
NEUTROPHILS NFR BLD AUTO: 78 % (ref 43–81)
PHOSPHATE SERPL-MCNC: 4.8 MG/DL (ref 2.5–4.9)
PLATELET # BLD AUTO: 173 K/UL (ref 150–450)
POTASSIUM SERPL-SCNC: 3.5 MMOL/L (ref 3.5–5.1)
RBC # BLD AUTO: 4.13 MIL/UL (ref 4–5.2)
SODIUM SERPL-SCNC: 135 MMOL/L (ref 136–145)
WBC NRBC COR # BLD AUTO: 7.9 K/UL (ref 4.3–11)

## 2022-04-29 RX ADMIN — Medication SCH EACH: at 23:43

## 2022-04-29 RX ADMIN — PIPERACILLIN SODIUM AND TAZOBACTAM SODIUM SCH MLS/HR: .25; 2 INJECTION, POWDER, LYOPHILIZED, FOR SOLUTION INTRAVENOUS at 14:06

## 2022-04-29 RX ADMIN — CLOTRIMAZOLE SCH GM: 1 CREAM TOPICAL at 17:41

## 2022-04-29 RX ADMIN — INSULIN GLARGINE SCH UNIT: 100 INJECTION, SOLUTION SUBCUTANEOUS at 22:00

## 2022-04-29 RX ADMIN — DEXTROSE MONOHYDRATE PRN MLS/HR: 50 INJECTION, SOLUTION INTRAVENOUS at 17:42

## 2022-04-29 RX ADMIN — PIPERACILLIN SODIUM AND TAZOBACTAM SODIUM SCH MLS/HR: .25; 2 INJECTION, POWDER, LYOPHILIZED, FOR SOLUTION INTRAVENOUS at 05:16

## 2022-04-29 RX ADMIN — Medication SCH EACH: at 17:41

## 2022-04-29 RX ADMIN — MINERAL SUPPLEMENT IRON 300 MG / 5 ML STRENGTH LIQUID 100 PER BOX UNFLAVORED SCH MG: at 14:03

## 2022-04-29 RX ADMIN — PIPERACILLIN SODIUM AND TAZOBACTAM SODIUM SCH MLS/HR: .25; 2 INJECTION, POWDER, LYOPHILIZED, FOR SOLUTION INTRAVENOUS at 22:50

## 2022-04-29 RX ADMIN — CLOTRIMAZOLE SCH GM: 1 CREAM TOPICAL at 14:03

## 2022-04-29 RX ADMIN — Medication SCH OZ: at 14:04

## 2022-04-29 RX ADMIN — Medication SCH EACH: at 00:19

## 2022-04-29 RX ADMIN — SODIUM CHLORIDE SCH MLS/HR: 9 INJECTION, SOLUTION INTRAVENOUS at 18:47

## 2022-04-29 RX ADMIN — PANTOPRAZOLE SODIUM SCH MG: 40 TABLET, DELAYED RELEASE ORAL at 14:03

## 2022-04-29 RX ADMIN — Medication SCH EACH: at 06:20

## 2022-04-29 RX ADMIN — Medication SCH EACH: at 14:03

## 2022-04-29 RX ADMIN — Medication SCH ML: at 17:41

## 2022-04-29 NOTE — NUR
OPENING NOTE:  REPORT RECEIVED FROM ANTONI BOWMAN.  PT IS CONFUSED, YELLING OUT CONSTANTLY FOR 
HELP BUT WHEN ASKED WHAT HELP SHE NEEDS SHE REPLIES THAT SHE NEEDS HELP.  PT MONITORED 
CLOSELY, NEEDS MET.  PT TO HAVE DIALYSIS TODAY PER MD ORDERS. PT CHECKED ON HOURLY AND PRN 
BY NURSING STAFF.

## 2022-04-29 NOTE — NUR
END OF SHIFT NOTE:  PT HAD HD TODAY 2L OFF PER HD RN'S REPORT.  PT WAS AGITATED THE ENTIRE 
SHIFT.  YELLED FOR HELP EVEN IF RN WAS IN THE ROOM.  WHEN RN ASKED PATIENT WHAT SHE NEEDED 
SHE JUST ANSWERED SHE NEEDED HELP.  PT CHECKED ON HOURLY AND PRN BY NURSING STAFF.

## 2022-04-29 NOTE — NUR
RN OPENING NOTES



RECEIVED PT IN BED, ASLEEP, AWAKENS TO VERBAL STIMULI. AOx1, WITH PERIODS OF CONFUSION. ON 
NC 3LPM AND TOLERATING WELL. NO SOB NOTED. NO S/SX OF RESPIRATORY DISTRESS NOTED. TELE 
MONITOR DETECTS SINUS RHYTHM. IV ACCESS IN SAMARA MIDLINE. IV IS INTACT, PATENT, AND FLUSHING 
WELL. SAFETY PRECAUTIONS IN PLACE: BED IN LOWEST, LOCKED POSITION, SIDERAILS UPx2, AND 
BRAKES ON. TABLE AND CALL LIGHT WITHIN REACH. WILL CONTINUE TO MONITOR.

## 2022-04-29 NOTE — NUR
RN NOTE



PATIENT REMAINS ALERT ORIENTED X1 VERBALLY RESPONSIVE ON 3L OXYGEN VIA NASAL CANNULA,O2:96% 
NO SOB NOT ACUTE DISTRESS NOTED,ALL DUE MEDS GIVEN AS MD ORDERED KEPT CLEAN AND DRY ALL THE 
TIME,TURNED AND REPOSITIONED EVERY 2 HOURS,SOFT BILATERAL RESTRAINS IN PLACE CHECKED EVERY 
15 MINS FOR CIRCULATION,ALL NEEDS MET ENDORSE NEXT COMING SHIFT FOR CONTINUATION OF CARE.

## 2022-04-30 VITALS — DIASTOLIC BLOOD PRESSURE: 79 MMHG | SYSTOLIC BLOOD PRESSURE: 146 MMHG

## 2022-04-30 VITALS — DIASTOLIC BLOOD PRESSURE: 67 MMHG | SYSTOLIC BLOOD PRESSURE: 133 MMHG

## 2022-04-30 VITALS — SYSTOLIC BLOOD PRESSURE: 116 MMHG | DIASTOLIC BLOOD PRESSURE: 68 MMHG

## 2022-04-30 VITALS — DIASTOLIC BLOOD PRESSURE: 67 MMHG | SYSTOLIC BLOOD PRESSURE: 119 MMHG

## 2022-04-30 VITALS — SYSTOLIC BLOOD PRESSURE: 129 MMHG | DIASTOLIC BLOOD PRESSURE: 67 MMHG

## 2022-04-30 VITALS — SYSTOLIC BLOOD PRESSURE: 126 MMHG | DIASTOLIC BLOOD PRESSURE: 88 MMHG

## 2022-04-30 LAB
BASOPHILS # BLD AUTO: 0 K/UL (ref 0–0.2)
BASOPHILS NFR BLD AUTO: 0.6 % (ref 0–2)
BUN SERPL-MCNC: 15 MG/DL (ref 7–18)
CALCIUM SERPL-MCNC: 8.2 MG/DL (ref 8.5–10.1)
CHLORIDE SERPL-SCNC: 105 MMOL/L (ref 98–107)
CO2 SERPL-SCNC: 28 MMOL/L (ref 21–32)
CREAT SERPL-MCNC: 2.6 MG/DL (ref 0.6–1.3)
EOSINOPHIL NFR BLD AUTO: 2.9 % (ref 0–6)
EOSINOPHIL NFR BLD MANUAL: 2 % (ref 0–4)
GLUCOSE SERPL-MCNC: 124 MG/DL (ref 74–106)
HCT VFR BLD AUTO: 29 % (ref 33–45)
HGB BLD-MCNC: 9.1 G/DL (ref 11.5–14.8)
LYMPHOCYTES NFR BLD AUTO: 0.7 K/UL (ref 0.8–4.8)
LYMPHOCYTES NFR BLD AUTO: 11.7 % (ref 20–44)
LYMPHOCYTES NFR BLD MANUAL: 12 % (ref 16–48)
MAGNESIUM SERPL-MCNC: 1.8 MG/DL (ref 1.8–2.4)
MCHC RBC AUTO-ENTMCNC: 32 G/DL (ref 31–36)
MCV RBC AUTO: 77 FL (ref 82–100)
MONOCYTES NFR BLD AUTO: 0.6 K/UL (ref 0.1–1.3)
MONOCYTES NFR BLD AUTO: 9.2 % (ref 2–12)
MONOCYTES NFR BLD MANUAL: 3 % (ref 0–11)
NEUTROPHILS # BLD AUTO: 4.7 K/UL (ref 1.8–8.9)
NEUTROPHILS NFR BLD AUTO: 75.6 % (ref 43–81)
NEUTS SEG NFR BLD MANUAL: 83 % (ref 42–76)
PHOSPHATE SERPL-MCNC: 4.3 MG/DL (ref 2.5–4.9)
PLATELET # BLD AUTO: 94 K/UL (ref 150–450)
POTASSIUM SERPL-SCNC: 3.3 MMOL/L (ref 3.5–5.1)
RBC # BLD AUTO: 3.78 MIL/UL (ref 4–5.2)
SODIUM SERPL-SCNC: 141 MMOL/L (ref 136–145)
WBC NRBC COR # BLD AUTO: 6.3 K/UL (ref 4.3–11)

## 2022-04-30 RX ADMIN — SODIUM CHLORIDE SCH MLS/HR: 9 INJECTION, SOLUTION INTRAVENOUS at 14:46

## 2022-04-30 RX ADMIN — PIPERACILLIN SODIUM AND TAZOBACTAM SODIUM SCH MLS/HR: .25; 2 INJECTION, POWDER, LYOPHILIZED, FOR SOLUTION INTRAVENOUS at 13:12

## 2022-04-30 RX ADMIN — PIPERACILLIN SODIUM AND TAZOBACTAM SODIUM SCH MLS/HR: .25; 2 INJECTION, POWDER, LYOPHILIZED, FOR SOLUTION INTRAVENOUS at 06:12

## 2022-04-30 RX ADMIN — INSULIN HUMAN PRN UNITS: 100 INJECTION, SOLUTION PARENTERAL at 23:10

## 2022-04-30 RX ADMIN — INSULIN HUMAN PRN UNITS: 100 INJECTION, SOLUTION PARENTERAL at 18:38

## 2022-04-30 RX ADMIN — Medication SCH OZ: at 09:42

## 2022-04-30 RX ADMIN — Medication SCH EACH: at 06:22

## 2022-04-30 RX ADMIN — CLOTRIMAZOLE SCH GM: 1 CREAM TOPICAL at 09:42

## 2022-04-30 RX ADMIN — Medication SCH EACH: at 23:07

## 2022-04-30 RX ADMIN — INSULIN GLARGINE SCH UNIT: 100 INJECTION, SOLUTION SUBCUTANEOUS at 23:11

## 2022-04-30 RX ADMIN — Medication SCH EACH: at 12:15

## 2022-04-30 RX ADMIN — PIPERACILLIN SODIUM AND TAZOBACTAM SODIUM SCH MLS/HR: .25; 2 INJECTION, POWDER, LYOPHILIZED, FOR SOLUTION INTRAVENOUS at 21:36

## 2022-04-30 RX ADMIN — Medication SCH ML: at 08:20

## 2022-04-30 RX ADMIN — Medication SCH ML: at 17:42

## 2022-04-30 RX ADMIN — CLOTRIMAZOLE SCH GM: 1 CREAM TOPICAL at 17:07

## 2022-04-30 RX ADMIN — PANTOPRAZOLE SODIUM SCH MG: 40 TABLET, DELAYED RELEASE ORAL at 08:20

## 2022-04-30 RX ADMIN — Medication SCH EACH: at 17:52

## 2022-04-30 NOTE — NUR
RN CLOSING NOTES



PT IN BED, ASLEEP, AWAKENS TO VERBAL STIMULI. AOx2, WITH PERIODS OF CONFUSION. ON NC 3LPM 
AND TOLERATING WELL. NO SOB NOTED. NO S/SX OF RESPIRATORY DISTRESS NOTED. TELE MONITOR 
DETECTS SINUS RHYTHM. IV ACCESS IN SAMARA MIDLINE. IV IS INTACT, PATENT, AND FLUSHING WELL. ALL 
NEEDS MET. PT KEPT CLEAN AND DRY. SAFETY PRECAUTIONS IN PLACE: BED IN LOWEST, LOCKED 
POSITION, SIDERAILS UPx2, AND BRAKES ON. TABLE AND CALL LIGHT WITHIN REACH. WILL ENDORSE TO 
ONCOMING SHIFT FOR YADI.

## 2022-04-30 NOTE — NUR
RN closing notes:

PT IN BED,awake. AOx2 to 3, WITH PERIODS OF CONFUSION. ON NC 3LPM AND TOLERATING WELL. NO 
SOB NOTED. NO S/SX OF RESPIRATORY DISTRESS NOTED. TELE MONITOR DETECTS SINUS RHYTHM. IV 
ACCESS IN SAMARA MIDLINE. IV IS INTACT, PATENT, AND FLUSHING WELL. ALL NEEDS MET. dialysis done 
early with 2500 fluid out without any complications.PT KEPT CLEAN AND DRY. SAFETY 
PRECAUTIONS IN PLACE: BED IN LOWEST, LOCKED POSITION, SIDERAILS UPx2, AND BRAKES ON. TABLE 
AND CALL LIGHT WITHIN REACH. WILL ENDORSE TO ONCOMING SHIFT FOR YADI.

## 2022-04-30 NOTE — NUR
TELE RN OPENING NOTES:

RECEIVED PT IN BED, ASLEEP, AWAKENS TO VERBAL STIMULI. AOx2, WITH PERIODS OF CONFUSION. 
RESPIRATION IS EVEN AND UNLABORED,ON NC 3LPM AND TOLERATING WELL. NO SOB NOTED. NO S/SX OF 
RESPIRATORY DISTRESS NOTED. TELE MONITOR DETECTS SINUS RHYTHM. IV ACCESS IN SAMARA MIDLINE. IV 
IS INTACT, PATENT, AND FLUSHING WELL. SAFETY PRECAUTIONS IN PLACE: BED IN LOWEST, LOCKED 
POSITION, SIDERAILS UPx2, AND BRAKES ON. CALL LIGHT WITHIN REACH. WILL CONTINUE TO MONITOR.

## 2022-04-30 NOTE — NUR
RN NOTE



RECEIVED PATIENT IN BED, AO X 2, IN NO ACUTE DISTRESS AT THIS TIME. BREATHING EVEN AND 
UNLABORED, SATURATION AT 98%, SR ON THE MONITOR, HR IS 80. NOTED IV SITE AT L WRIST 20G, AND 
SAMARA MIDLINE, PATENT AND FLUSHING WELL, NO S/S OF INFECTION OR INFILTRATION, R IJ HD CATH IN 
PLACE, NO INFECTION OR BLEEDING NOTED.B SOFT WRIST RESTRAINTS IN PLACE, SKIN AND CIRCULATION 
WAS CHECKED AND ARE WNL. SAFETY MEASURES IMPLEMENTED. PATIENT BED ALARM IS ON. HEAD OF BED 
ELEVATED. BED IS LOCKED,  IN LOWEST POSITION AND SIDE RAILS UP. CALL LIGHT WITHIN REACH OF 
THE PATIENT. WILL CONTINUE TO MONITOR AND REASSESS FOR ANY CHANGES.

## 2022-05-01 VITALS — DIASTOLIC BLOOD PRESSURE: 61 MMHG | SYSTOLIC BLOOD PRESSURE: 123 MMHG

## 2022-05-01 VITALS — DIASTOLIC BLOOD PRESSURE: 71 MMHG | SYSTOLIC BLOOD PRESSURE: 105 MMHG

## 2022-05-01 VITALS — SYSTOLIC BLOOD PRESSURE: 113 MMHG | DIASTOLIC BLOOD PRESSURE: 63 MMHG

## 2022-05-01 VITALS — SYSTOLIC BLOOD PRESSURE: 122 MMHG | DIASTOLIC BLOOD PRESSURE: 70 MMHG

## 2022-05-01 VITALS — DIASTOLIC BLOOD PRESSURE: 56 MMHG | SYSTOLIC BLOOD PRESSURE: 102 MMHG

## 2022-05-01 VITALS — SYSTOLIC BLOOD PRESSURE: 105 MMHG | DIASTOLIC BLOOD PRESSURE: 71 MMHG

## 2022-05-01 LAB
BASOPHILS # BLD AUTO: 0 K/UL (ref 0–0.2)
BASOPHILS NFR BLD AUTO: 0.4 % (ref 0–2)
BUN SERPL-MCNC: 19 MG/DL (ref 7–18)
CALCIUM SERPL-MCNC: 7.9 MG/DL (ref 8.5–10.1)
CHLORIDE SERPL-SCNC: 104 MMOL/L (ref 98–107)
CO2 SERPL-SCNC: 29 MMOL/L (ref 21–32)
CREAT SERPL-MCNC: 2.5 MG/DL (ref 0.6–1.3)
EOSINOPHIL NFR BLD AUTO: 3.4 % (ref 0–6)
GLUCOSE SERPL-MCNC: 132 MG/DL (ref 74–106)
HCT VFR BLD AUTO: 31 % (ref 33–45)
HGB BLD-MCNC: 9.6 G/DL (ref 11.5–14.8)
LYMPHOCYTES NFR BLD AUTO: 0.7 K/UL (ref 0.8–4.8)
LYMPHOCYTES NFR BLD AUTO: 8.8 % (ref 20–44)
MAGNESIUM SERPL-MCNC: 2.1 MG/DL (ref 1.8–2.4)
MCHC RBC AUTO-ENTMCNC: 31 G/DL (ref 31–36)
MCV RBC AUTO: 77 FL (ref 82–100)
MONOCYTES NFR BLD AUTO: 0.8 K/UL (ref 0.1–1.3)
MONOCYTES NFR BLD AUTO: 9 % (ref 2–12)
NEUTROPHILS # BLD AUTO: 6.6 K/UL (ref 1.8–8.9)
NEUTROPHILS NFR BLD AUTO: 78.4 % (ref 43–81)
PHOSPHATE SERPL-MCNC: 2.9 MG/DL (ref 2.5–4.9)
PLATELET # BLD AUTO: 103 K/UL (ref 150–450)
POTASSIUM SERPL-SCNC: 3.4 MMOL/L (ref 3.5–5.1)
RBC # BLD AUTO: 4.03 MIL/UL (ref 4–5.2)
SODIUM SERPL-SCNC: 140 MMOL/L (ref 136–145)
WBC NRBC COR # BLD AUTO: 8.4 K/UL (ref 4.3–11)

## 2022-05-01 RX ADMIN — Medication SCH EACH: at 13:26

## 2022-05-01 RX ADMIN — Medication SCH EACH: at 07:30

## 2022-05-01 RX ADMIN — Medication SCH EACH: at 21:48

## 2022-05-01 RX ADMIN — PIPERACILLIN SODIUM AND TAZOBACTAM SODIUM SCH MLS/HR: .25; 2 INJECTION, POWDER, LYOPHILIZED, FOR SOLUTION INTRAVENOUS at 21:24

## 2022-05-01 RX ADMIN — CLOTRIMAZOLE SCH GM: 1 CREAM TOPICAL at 17:26

## 2022-05-01 RX ADMIN — PIPERACILLIN SODIUM AND TAZOBACTAM SODIUM SCH MLS/HR: .25; 2 INJECTION, POWDER, LYOPHILIZED, FOR SOLUTION INTRAVENOUS at 06:02

## 2022-05-01 RX ADMIN — PIPERACILLIN SODIUM AND TAZOBACTAM SODIUM SCH MLS/HR: .25; 2 INJECTION, POWDER, LYOPHILIZED, FOR SOLUTION INTRAVENOUS at 13:26

## 2022-05-01 RX ADMIN — Medication SCH ML: at 08:00

## 2022-05-01 RX ADMIN — PANTOPRAZOLE SODIUM SCH MG: 40 TABLET, DELAYED RELEASE ORAL at 09:00

## 2022-05-01 RX ADMIN — SODIUM CHLORIDE SCH MLS/HR: 9 INJECTION, SOLUTION INTRAVENOUS at 17:25

## 2022-05-01 RX ADMIN — Medication SCH EACH: at 12:59

## 2022-05-01 RX ADMIN — Medication SCH ML: at 17:26

## 2022-05-01 RX ADMIN — Medication SCH EACH: at 17:25

## 2022-05-01 RX ADMIN — INSULIN GLARGINE SCH UNIT: 100 INJECTION, SOLUTION SUBCUTANEOUS at 22:00

## 2022-05-01 RX ADMIN — CLOTRIMAZOLE SCH GM: 1 CREAM TOPICAL at 09:00

## 2022-05-01 RX ADMIN — Medication SCH OZ: at 09:00

## 2022-05-01 NOTE — NUR
RN NOTE



RECEIVED PT ON BED , AWAKE, RESPONDED TO STIMULI, ALERT AND ORIENTED X2, O2 VIA NC AT 3L 
TOLERATING WELL, NOT IN RESPIRATORY DISTRESS, SR ON TELEMONITOR, WITH R HD CATH, SAMARA 
MIDLINE, L WRIST #20, PATENT AND INTACT, PT NOTED BILATERAL SOFT WRIST RESTRAINTS,  WITH 
RICHMOND CATH IN PLACE WITH YELLOWISH COLORED URINE, SAFETY MEASURES PROVIDED, SIDE RAILS UP, 
BED LOCKED AND IN LOWEST POSITION, CALL LIGHT WITHIN REACH, ALL NEEDS PROVIDED, WILL 
CONTINUE TO MONITOR PATIENT.

## 2022-05-02 VITALS — DIASTOLIC BLOOD PRESSURE: 57 MMHG | SYSTOLIC BLOOD PRESSURE: 110 MMHG

## 2022-05-02 VITALS — DIASTOLIC BLOOD PRESSURE: 80 MMHG | SYSTOLIC BLOOD PRESSURE: 132 MMHG

## 2022-05-02 VITALS — DIASTOLIC BLOOD PRESSURE: 74 MMHG | SYSTOLIC BLOOD PRESSURE: 145 MMHG

## 2022-05-02 VITALS — SYSTOLIC BLOOD PRESSURE: 122 MMHG | DIASTOLIC BLOOD PRESSURE: 70 MMHG

## 2022-05-02 VITALS — SYSTOLIC BLOOD PRESSURE: 96 MMHG | DIASTOLIC BLOOD PRESSURE: 51 MMHG

## 2022-05-02 LAB
BASOPHILS # BLD AUTO: 0 K/UL (ref 0–0.2)
BASOPHILS NFR BLD AUTO: 0.6 % (ref 0–2)
BUN SERPL-MCNC: 23 MG/DL (ref 7–18)
CALCIUM SERPL-MCNC: 7.9 MG/DL (ref 8.5–10.1)
CHLORIDE SERPL-SCNC: 104 MMOL/L (ref 98–107)
CO2 SERPL-SCNC: 26 MMOL/L (ref 21–32)
CREAT SERPL-MCNC: 3.2 MG/DL (ref 0.6–1.3)
EOSINOPHIL NFR BLD AUTO: 4 % (ref 0–6)
EOSINOPHIL NFR BLD MANUAL: 4 % (ref 0–4)
GLUCOSE SERPL-MCNC: 133 MG/DL (ref 74–106)
HCT VFR BLD AUTO: 31 % (ref 33–45)
HGB BLD-MCNC: 9.4 G/DL (ref 11.5–14.8)
LYMPHOCYTES NFR BLD AUTO: 1 K/UL (ref 0.8–4.8)
LYMPHOCYTES NFR BLD AUTO: 16.2 % (ref 20–44)
LYMPHOCYTES NFR BLD MANUAL: 21 % (ref 16–48)
MAGNESIUM SERPL-MCNC: 2.1 MG/DL (ref 1.8–2.4)
MCHC RBC AUTO-ENTMCNC: 31 G/DL (ref 31–36)
MCV RBC AUTO: 78 FL (ref 82–100)
MONOCYTES NFR BLD AUTO: 0.6 K/UL (ref 0.1–1.3)
MONOCYTES NFR BLD AUTO: 9.5 % (ref 2–12)
MONOCYTES NFR BLD MANUAL: 5 % (ref 0–11)
NEUTROPHILS # BLD AUTO: 4.4 K/UL (ref 1.8–8.9)
NEUTROPHILS NFR BLD AUTO: 69.7 % (ref 43–81)
NEUTS SEG NFR BLD MANUAL: 70 % (ref 42–76)
PHOSPHATE SERPL-MCNC: 4.5 MG/DL (ref 2.5–4.9)
PLATELET # BLD AUTO: 58 K/UL (ref 150–450)
POTASSIUM SERPL-SCNC: 3.5 MMOL/L (ref 3.5–5.1)
RBC # BLD AUTO: 3.9 MIL/UL (ref 4–5.2)
SODIUM SERPL-SCNC: 138 MMOL/L (ref 136–145)
WBC NRBC COR # BLD AUTO: 6.3 K/UL (ref 4.3–11)

## 2022-05-02 RX ADMIN — Medication SCH EACH: at 12:25

## 2022-05-02 RX ADMIN — Medication SCH ML: at 08:43

## 2022-05-02 RX ADMIN — Medication SCH OZ: at 08:42

## 2022-05-02 RX ADMIN — PIPERACILLIN SODIUM AND TAZOBACTAM SODIUM SCH MLS/HR: .25; 2 INJECTION, POWDER, LYOPHILIZED, FOR SOLUTION INTRAVENOUS at 04:43

## 2022-05-02 RX ADMIN — Medication SCH EACH: at 17:35

## 2022-05-02 RX ADMIN — SODIUM CHLORIDE SCH MLS/HR: 9 INJECTION, SOLUTION INTRAVENOUS at 14:51

## 2022-05-02 RX ADMIN — Medication SCH EACH: at 08:19

## 2022-05-02 RX ADMIN — Medication SCH ML: at 17:15

## 2022-05-02 RX ADMIN — PANTOPRAZOLE SODIUM SCH MG: 40 TABLET, DELAYED RELEASE ORAL at 08:41

## 2022-05-02 RX ADMIN — INSULIN HUMAN PRN UNITS: 100 INJECTION, SOLUTION PARENTERAL at 12:24

## 2022-05-02 RX ADMIN — CLOTRIMAZOLE SCH APPLIC: 1 CREAM TOPICAL at 17:15

## 2022-05-02 RX ADMIN — PIPERACILLIN SODIUM AND TAZOBACTAM SODIUM SCH MLS/HR: .25; 2 INJECTION, POWDER, LYOPHILIZED, FOR SOLUTION INTRAVENOUS at 12:25

## 2022-05-02 RX ADMIN — INSULIN HUMAN PRN UNITS: 100 INJECTION, SOLUTION PARENTERAL at 17:47

## 2022-05-02 RX ADMIN — CLOTRIMAZOLE SCH GM: 1 CREAM TOPICAL at 08:41

## 2022-05-02 NOTE — NUR
TELE RN NOTES

PATIENT DISCHARGE TO [facility], REPORT GIVEN BRIGITTE BOWMAN. PATIENT WITH STABLE VITAL 
SIGNS. NO ACUTE DISTRESS NOTED. NO SOB NOTED. DENIED ANY PAIN, NO FACIAL GRIMACING NOTED. 
DISCHARGE INSTRUCTIONS  HANDED OVER TO EMT PERSONNEL INCLUDING ALL BELONGINGS  INCLUDING 
CELLPHONE AND MONEY TAKEN WITH THE PATIENT. IV ACCESS REMOVED, TIP INTACT, NO REDNESS, NO 
BLEEDING, NO SWELLING NOTED. RIGHT CHEST WALL DIALYSIS ACCESS INTACT WITH TRANSPARENT 
DRESSING, NO REDNESS, NO SWELLING, NO BLEEDING NOTED. RICHMOND CATHETER PATENT AND INTACT . 
PICKED UP VIA AMBULANCE IN A GURNEY ACCOMPANIED BY 2 EMT PERSONNEL IN STABLE CONDITION.  
PATIENT REFUSED PHOTO TAKEN. SKIN IS INTACT.

## 2022-05-02 NOTE — NUR
RN CLOSING NOTE





NO SIGNIFICANT CHANGES THROUGHOUT THE SHIFT. PT ASLEEP AWAKENS TO VERBAL STIMULI. ALERT AND 
ORIENTED X3. WITH O2 VIA NASAL CANNULA RUNNING AT 3LPM AND TOLERATING WELL. DENIES SOB. NO 
S/SX OF RESPIRATORY DISTRESS NOTED. TELE MONITOR DETECTS SINUS RHYTHM. IV ACCESS IN SAMARA 
MIDLINE. IV IS INTACT, PATENT, AND FLUSHING WELL. NOTED WITH RICHMOND CATHETER IN PLACE 
DRAINING YELLOWISH COLORED URINE, ALL DUE MEDS GIVEN. PT KEPT CLEAN AND DRY. SAFETY MEASURES 
PROVIDED: BED IN LOWEST, LOCKED POSITION, CALL LIGHT WITHIN REACH. WILL ENDORSE TO THE DAY 
SHIFT NURSE.

## 2022-05-02 NOTE — NUR
TELE RN NOTES

TOOK OVER CARE, REPORT GIVEN BY SIOBHAN BOWMAN. PATIENT LYING IN BED, HEAD OF BED ELEVATED. 
ALERT ORIENTED X 2-3. NO ACUTE DISTRESS NOTED.BREATHING UNLABORED. DENIED ANY PAIN.  SINUS 
RHYTHM ON TELE MONITOR. BILATERAL SOFT RESTRAINT CHECKED WITH GOOD CIRCULATION AND NO SKIN 
BREAK DOWN. SAFETY MEASURES IN PLACE, CALL LIGHT WITHIN REACH. WILL CONTINUE TO MONITOR 
ACCORDINGLY.

## 2022-07-08 ENCOUNTER — HOSPITAL ENCOUNTER (INPATIENT)
Dept: HOSPITAL 12 - ER | Age: 57
LOS: 4 days | Discharge: NURSING, REHAB OR LONG-TERM CARE | DRG: 720 | End: 2022-07-12
Payer: MEDICARE

## 2022-07-08 VITALS — SYSTOLIC BLOOD PRESSURE: 100 MMHG | DIASTOLIC BLOOD PRESSURE: 82 MMHG

## 2022-07-08 VITALS — BODY MASS INDEX: 35.5 KG/M2 | HEIGHT: 61 IN | WEIGHT: 188 LBS

## 2022-07-08 VITALS — SYSTOLIC BLOOD PRESSURE: 95 MMHG | DIASTOLIC BLOOD PRESSURE: 53 MMHG

## 2022-07-08 VITALS — SYSTOLIC BLOOD PRESSURE: 105 MMHG | DIASTOLIC BLOOD PRESSURE: 63 MMHG

## 2022-07-08 DIAGNOSIS — I25.2: ICD-10-CM

## 2022-07-08 DIAGNOSIS — A41.9: Primary | ICD-10-CM

## 2022-07-08 DIAGNOSIS — J44.0: ICD-10-CM

## 2022-07-08 DIAGNOSIS — N17.0: ICD-10-CM

## 2022-07-08 DIAGNOSIS — E11.40: ICD-10-CM

## 2022-07-08 DIAGNOSIS — N18.9: ICD-10-CM

## 2022-07-08 DIAGNOSIS — L40.9: ICD-10-CM

## 2022-07-08 DIAGNOSIS — E11.22: ICD-10-CM

## 2022-07-08 DIAGNOSIS — E78.5: ICD-10-CM

## 2022-07-08 DIAGNOSIS — G89.4: ICD-10-CM

## 2022-07-08 DIAGNOSIS — Z71.3: ICD-10-CM

## 2022-07-08 DIAGNOSIS — E87.1: ICD-10-CM

## 2022-07-08 DIAGNOSIS — N39.0: ICD-10-CM

## 2022-07-08 DIAGNOSIS — B95.62: ICD-10-CM

## 2022-07-08 DIAGNOSIS — K21.9: ICD-10-CM

## 2022-07-08 DIAGNOSIS — E66.9: ICD-10-CM

## 2022-07-08 DIAGNOSIS — E44.0: ICD-10-CM

## 2022-07-08 DIAGNOSIS — Z74.01: ICD-10-CM

## 2022-07-08 DIAGNOSIS — J96.01: ICD-10-CM

## 2022-07-08 DIAGNOSIS — I13.0: ICD-10-CM

## 2022-07-08 DIAGNOSIS — J15.9: ICD-10-CM

## 2022-07-08 DIAGNOSIS — I50.33: ICD-10-CM

## 2022-07-08 DIAGNOSIS — Z20.822: ICD-10-CM

## 2022-07-08 DIAGNOSIS — M06.9: ICD-10-CM

## 2022-07-08 DIAGNOSIS — D64.9: ICD-10-CM

## 2022-07-08 LAB
ALP SERPL-CCNC: 108 U/L (ref 50–136)
ALT SERPL W/O P-5'-P-CCNC: 29 U/L (ref 14–59)
APPEARANCE UR: (no result)
AST SERPL-CCNC: 31 U/L (ref 15–37)
BILIRUB DIRECT SERPL-MCNC: 0.1 MG/DL (ref 0–0.2)
BILIRUB SERPL-MCNC: 0.4 MG/DL (ref 0.2–1)
BILIRUB UR QL STRIP: NEGATIVE
BUN SERPL-MCNC: 54 MG/DL (ref 7–18)
CHLORIDE SERPL-SCNC: 92 MMOL/L (ref 98–107)
CO2 SERPL-SCNC: 28 MMOL/L (ref 21–32)
COLOR UR: YELLOW
CREAT SERPL-MCNC: 2.5 MG/DL (ref 0.6–1.3)
DEPRECATED SQUAMOUS URNS QL MICRO: (no result) /HPF
GLUCOSE SERPL-MCNC: 196 MG/DL (ref 74–106)
GLUCOSE UR STRIP-MCNC: NEGATIVE MG/DL
HCT VFR BLD AUTO: 30.5 % (ref 31.2–41.9)
HGB UR QL STRIP: (no result)
KETONES UR STRIP-MCNC: NEGATIVE MG/DL
LEUKOCYTE ESTERASE UR QL STRIP: (no result)
MCH RBC QN AUTO: 25.5 UUG (ref 24.7–32.8)
MCV RBC AUTO: 80.1 FL (ref 75.5–95.3)
NITRITE UR QL STRIP: NEGATIVE
PH UR STRIP: 5 [PH] (ref 5–8)
PLATELET # BLD AUTO: 344 K/UL (ref 179–408)
POTASSIUM SERPL-SCNC: 4.7 MMOL/L (ref 3.5–5.1)
SP GR UR STRIP: 1.02 (ref 1–1.03)
UROBILINOGEN UR STRIP-MCNC: 0.2 E.U./DL
WBC #/AREA URNS HPF: (no result) /HPF
WBC #/AREA URNS HPF: (no result) /HPF (ref 0–3)
WS STN SPEC: 8.4 G/DL (ref 6.4–8.2)

## 2022-07-08 PROCEDURE — G0378 HOSPITAL OBSERVATION PER HR: HCPCS

## 2022-07-08 PROCEDURE — A4663 DIALYSIS BLOOD PRESSURE CUFF: HCPCS

## 2022-07-08 RX ADMIN — SENNOSIDES SCH TAB: 8.6 TABLET, COATED ORAL at 21:43

## 2022-07-08 RX ADMIN — Medication SCH EACH: at 13:04

## 2022-07-08 RX ADMIN — SODIUM CHLORIDE PRN UNIT: 9 INJECTION, SOLUTION INTRAVENOUS at 16:54

## 2022-07-08 RX ADMIN — Medication SCH EACH: at 16:51

## 2022-07-08 RX ADMIN — SODIUM CHLORIDE PRN UNIT: 9 INJECTION, SOLUTION INTRAVENOUS at 13:05

## 2022-07-08 RX ADMIN — LACTOBACILLUS ACIDOPH-L.BULGARICUS 1 MILLION CELL CHEWABLE TABLET SCH TAB.CHEW: at 16:49

## 2022-07-08 RX ADMIN — Medication SCH EACH: at 21:48

## 2022-07-08 RX ADMIN — LACTOBACILLUS ACIDOPH-L.BULGARICUS 1 MILLION CELL CHEWABLE TABLET SCH TAB.CHEW: at 13:06

## 2022-07-08 RX ADMIN — INSULIN HUMAN PRN UNITS: 100 INJECTION, SOLUTION PARENTERAL at 21:52

## 2022-07-08 RX ADMIN — FERROUS GLUCONATE SCH MG: 324 TABLET ORAL at 21:44

## 2022-07-08 NOTE — NUR
Patient is resting comfortably on gurney with eyes closed. NP Mirna Dao came 
to ER. Patient is waiting for an available telemetry nurse@this time.

## 2022-07-08 NOTE — NUR
Thorough report given to staff teleAMRN using sbar method.  All questions 
answered.  All belongings accounted for.  Pt is stable, all needs met, bed 
dropped, rails up, pt knows to call out if she needs anything. VSS, PE WNL, 
NAD. Pt PERRLA. no s/sx of distress present.

## 2022-07-08 NOTE — NUR
Tele called to give report for pt going to 302. Charge told me that basically 
they will be unable to accept report for the foreseeable future due to being 
understaffed and without any CNAs.  EDMD informed as well as House Sup.  Will 
inform pt and will retry in approx an hour.

## 2022-07-08 NOTE — NUR
thorough report received from staff PMRN using sbar method.  Nothing pending on 
pt, awaiting admission to tele going to room 302, will call after shift change 
for to give report. VSS.  Pt sleeping soundly with audible snorring. 96/65, 
100bpm, 98%, 18rpm, 2/10 pain.  Boarderline ST.  Pt easily arousable.  AAOx4, 
completely lucid. Pt denies any moderate pain besides her chronic arthitic 
pain. No s/sxof distress present.

## 2022-07-08 NOTE — NUR
patient admitted to Telemetry, Sinus rhythm with HR in the 90's. Patient is alert and 
oriented x4. Denies any SOB or chest pain. Lung sounds are clear. Patient has arthritis on 
hand knuckles. ABD is soft and non distended, noted with mild contractures knees, stiffness 
and pain noted. Patient has permacath to right chest wall, dressing in place, clean and dry. 
States she had dialysis the last time in June. She is a jehovas witness and refuses blood 
and blood products. All needs attended, call light within reach.

## 2022-07-09 VITALS — DIASTOLIC BLOOD PRESSURE: 58 MMHG | SYSTOLIC BLOOD PRESSURE: 96 MMHG

## 2022-07-09 VITALS — DIASTOLIC BLOOD PRESSURE: 53 MMHG | SYSTOLIC BLOOD PRESSURE: 106 MMHG

## 2022-07-09 VITALS — SYSTOLIC BLOOD PRESSURE: 117 MMHG | DIASTOLIC BLOOD PRESSURE: 69 MMHG

## 2022-07-09 VITALS — DIASTOLIC BLOOD PRESSURE: 76 MMHG | SYSTOLIC BLOOD PRESSURE: 121 MMHG

## 2022-07-09 VITALS — DIASTOLIC BLOOD PRESSURE: 70 MMHG | SYSTOLIC BLOOD PRESSURE: 127 MMHG

## 2022-07-09 LAB
AMORPH URATE CRY URNS QL MICRO: (no result) /HPF
APPEARANCE UR: CLEAR
BILIRUB UR QL STRIP: NEGATIVE
BUN SERPL-MCNC: 46 MG/DL (ref 7–18)
CHLORIDE SERPL-SCNC: 102 MMOL/L (ref 98–107)
CO2 SERPL-SCNC: 25 MMOL/L (ref 21–32)
COLOR UR: YELLOW
CREAT SERPL-MCNC: 2.4 MG/DL (ref 0.6–1.3)
CREAT UR-MCNC: 89 MG/DL (ref 30–125)
DEPRECATED SQUAMOUS URNS QL MICRO: (no result) /HPF
EOSINOPHIL NFR BLD MANUAL: 2 % (ref 0–8)
GLUCOSE SERPL-MCNC: 171 MG/DL (ref 74–106)
GLUCOSE UR STRIP-MCNC: NEGATIVE MG/DL
HCT VFR BLD AUTO: 26.8 % (ref 31.2–41.9)
HGB UR QL STRIP: NEGATIVE
KETONES UR STRIP-MCNC: NEGATIVE MG/DL
LEUKOCYTE ESTERASE UR QL STRIP: NEGATIVE
LYMPHOCYTES NFR BLD MANUAL: 7 % (ref 20–40)
MAGNESIUM SERPL-MCNC: 1.9 MG/DL (ref 1.8–2.4)
MCH RBC QN AUTO: 25.9 UUG (ref 24.7–32.8)
MCV RBC AUTO: 82.1 FL (ref 75.5–95.3)
MONOCYTES NFR BLD MANUAL: 7 % (ref 2–10)
NEUTS SEG NFR BLD MANUAL: 84 % (ref 42–75)
NITRITE UR QL STRIP: NEGATIVE
PH UR STRIP: 5.5 [PH] (ref 5–8)
PHOSPHATE SERPL-MCNC: 6.8 MG/DL (ref 2.5–4.9)
PLATELET # BLD AUTO: 297 K/UL (ref 179–408)
POTASSIUM SERPL-SCNC: 4.4 MMOL/L (ref 3.5–5.1)
RBC #/AREA URNS HPF: (no result) /HPF (ref 0–3)
SP GR UR STRIP: 1.02 (ref 1–1.03)
UROBILINOGEN UR STRIP-MCNC: 0.2 E.U./DL
WBC #/AREA URNS HPF: (no result) /HPF
WBC #/AREA URNS HPF: (no result) /HPF (ref 0–3)

## 2022-07-09 PROCEDURE — 05PYX3Z REMOVAL OF INFUSION DEVICE FROM UPPER VEIN, EXTERNAL APPROACH: ICD-10-PCS

## 2022-07-09 RX ADMIN — DEXTROSE SCH MLS/HR: 50 INJECTION, SOLUTION INTRAVENOUS at 12:49

## 2022-07-09 RX ADMIN — LACTOBACILLUS ACIDOPH-L.BULGARICUS 1 MILLION CELL CHEWABLE TABLET SCH TAB.CHEW: at 12:49

## 2022-07-09 RX ADMIN — LACTOBACILLUS ACIDOPH-L.BULGARICUS 1 MILLION CELL CHEWABLE TABLET SCH TAB.CHEW: at 17:07

## 2022-07-09 RX ADMIN — Medication SCH MG: at 09:06

## 2022-07-09 RX ADMIN — POLYETHYLENE GLYCOL 3350 SCH GM: 17 POWDER, FOR SOLUTION ORAL at 09:06

## 2022-07-09 RX ADMIN — DEXTROSE SCH MLS/HR: 50 INJECTION, SOLUTION INTRAVENOUS at 03:14

## 2022-07-09 RX ADMIN — FERROUS GLUCONATE SCH MG: 324 TABLET ORAL at 09:06

## 2022-07-09 RX ADMIN — INSULIN HUMAN PRN UNITS: 100 INJECTION, SOLUTION PARENTERAL at 21:12

## 2022-07-09 RX ADMIN — SODIUM CHLORIDE PRN UNIT: 9 INJECTION, SOLUTION INTRAVENOUS at 17:08

## 2022-07-09 RX ADMIN — SENNOSIDES SCH TAB: 8.6 TABLET, COATED ORAL at 20:04

## 2022-07-09 RX ADMIN — Medication SCH EACH: at 11:41

## 2022-07-09 RX ADMIN — FERROUS GLUCONATE SCH MG: 324 TABLET ORAL at 20:04

## 2022-07-09 RX ADMIN — Medication SCH EACH: at 06:49

## 2022-07-09 RX ADMIN — MUPIROCIN SCH APPLIC: 20 OINTMENT TOPICAL at 20:03

## 2022-07-09 RX ADMIN — LACTOBACILLUS ACIDOPH-L.BULGARICUS 1 MILLION CELL CHEWABLE TABLET SCH TAB.CHEW: at 09:06

## 2022-07-09 RX ADMIN — SODIUM CHLORIDE PRN UNIT: 9 INJECTION, SOLUTION INTRAVENOUS at 11:43

## 2022-07-09 RX ADMIN — Medication SCH EACH: at 20:04

## 2022-07-09 RX ADMIN — Medication SCH EACH: at 17:07

## 2022-07-09 RX ADMIN — DOCUSATE SODIUM SCH MG: 100 CAPSULE, LIQUID FILLED ORAL at 09:05

## 2022-07-09 RX ADMIN — SODIUM CHLORIDE PRN MLS/HR: 0.9 INJECTION, SOLUTION INTRAVENOUS at 11:12

## 2022-07-09 NOTE — NUR
Pt complain of SOB -- O2 increased to 3lpm via NC, HOB elevated, and instructed on deep 
breathing. O2 sat at 99% and pt feeling much better. Reminded not to take her nasal cannula 
off, also assisted in comfortable position. Will continue to monitor.

## 2022-07-09 NOTE — NUR
Received pt asleep on bed with O2 2lpm via NC saturating at 98%, easily arousable for care. 
IV access on R wrist G#20 still patent and intact with ongoing NS running at 75 ml/hr. No 
s/sx of infiltration on IV site. All needs attended. Call light placed within reach.

## 2022-07-09 NOTE — NUR
O2 at 2L/NC with O2 sat of 97%. BG monitored with Insulin sliding scale. Voiding freely. 
Afebrile. IV antibiotics given as ordered. IVF infusing well.

## 2022-07-10 VITALS — SYSTOLIC BLOOD PRESSURE: 122 MMHG | DIASTOLIC BLOOD PRESSURE: 83 MMHG

## 2022-07-10 VITALS — DIASTOLIC BLOOD PRESSURE: 71 MMHG | SYSTOLIC BLOOD PRESSURE: 144 MMHG

## 2022-07-10 VITALS — SYSTOLIC BLOOD PRESSURE: 119 MMHG | DIASTOLIC BLOOD PRESSURE: 78 MMHG

## 2022-07-10 VITALS — DIASTOLIC BLOOD PRESSURE: 70 MMHG | SYSTOLIC BLOOD PRESSURE: 127 MMHG

## 2022-07-10 LAB
BUN SERPL-MCNC: 38 MG/DL (ref 7–18)
CHLORIDE SERPL-SCNC: 103 MMOL/L (ref 98–107)
CO2 SERPL-SCNC: 23 MMOL/L (ref 21–32)
CREAT SERPL-MCNC: 2.1 MG/DL (ref 0.6–1.3)
GLUCOSE SERPL-MCNC: 263 MG/DL (ref 74–106)
HCT VFR BLD AUTO: 27.4 % (ref 31.2–41.9)
LYMPHOCYTES NFR BLD MANUAL: 6 % (ref 20–40)
MCH RBC QN AUTO: 25.5 UUG (ref 24.7–32.8)
MCV RBC AUTO: 82.4 FL (ref 75.5–95.3)
MONOCYTES NFR BLD MANUAL: 6 % (ref 2–10)
NEUTS SEG NFR BLD MANUAL: 88 % (ref 42–75)
PLATELET # BLD AUTO: 304 K/UL (ref 179–408)
POTASSIUM SERPL-SCNC: 4.8 MMOL/L (ref 3.5–5.1)

## 2022-07-10 RX ADMIN — MUPIROCIN SCH APPLIC: 20 OINTMENT TOPICAL at 21:30

## 2022-07-10 RX ADMIN — LACTOBACILLUS ACIDOPH-L.BULGARICUS 1 MILLION CELL CHEWABLE TABLET SCH TAB.CHEW: at 08:23

## 2022-07-10 RX ADMIN — DEXTROSE SCH MLS/HR: 50 INJECTION, SOLUTION INTRAVENOUS at 23:27

## 2022-07-10 RX ADMIN — LACTOBACILLUS ACIDOPH-L.BULGARICUS 1 MILLION CELL CHEWABLE TABLET SCH TAB.CHEW: at 17:27

## 2022-07-10 RX ADMIN — Medication SCH EACH: at 16:39

## 2022-07-10 RX ADMIN — SODIUM CHLORIDE PRN MLS/HR: 0.9 INJECTION, SOLUTION INTRAVENOUS at 03:24

## 2022-07-10 RX ADMIN — Medication SCH MG: at 08:23

## 2022-07-10 RX ADMIN — FERROUS GLUCONATE SCH MG: 324 TABLET ORAL at 08:23

## 2022-07-10 RX ADMIN — Medication SCH EACH: at 11:42

## 2022-07-10 RX ADMIN — ALBUTEROL SULFATE PRN MG: 2.5 SOLUTION RESPIRATORY (INHALATION) at 04:12

## 2022-07-10 RX ADMIN — SODIUM CHLORIDE PRN UNIT: 9 INJECTION, SOLUTION INTRAVENOUS at 08:22

## 2022-07-10 RX ADMIN — FERROUS GLUCONATE SCH MG: 324 TABLET ORAL at 21:30

## 2022-07-10 RX ADMIN — Medication SCH EACH: at 06:32

## 2022-07-10 RX ADMIN — LACTOBACILLUS ACIDOPH-L.BULGARICUS 1 MILLION CELL CHEWABLE TABLET SCH TAB.CHEW: at 13:29

## 2022-07-10 RX ADMIN — MUPIROCIN SCH APPLIC: 20 OINTMENT TOPICAL at 08:23

## 2022-07-10 RX ADMIN — Medication SCH EACH: at 21:39

## 2022-07-10 RX ADMIN — POLYETHYLENE GLYCOL 3350 SCH GM: 17 POWDER, FOR SOLUTION ORAL at 08:29

## 2022-07-10 RX ADMIN — DOCUSATE SODIUM SCH MG: 100 CAPSULE, LIQUID FILLED ORAL at 08:23

## 2022-07-10 RX ADMIN — SODIUM CHLORIDE PRN UNIT: 9 INJECTION, SOLUTION INTRAVENOUS at 11:43

## 2022-07-10 RX ADMIN — ALBUTEROL SULFATE PRN MG: 2.5 SOLUTION RESPIRATORY (INHALATION) at 16:42

## 2022-07-10 RX ADMIN — SODIUM CHLORIDE PRN UNIT: 9 INJECTION, SOLUTION INTRAVENOUS at 22:05

## 2022-07-10 RX ADMIN — DEXTROSE SCH MLS/HR: 50 INJECTION, SOLUTION INTRAVENOUS at 03:29

## 2022-07-10 RX ADMIN — SENNOSIDES SCH TAB: 8.6 TABLET, COATED ORAL at 21:31

## 2022-07-10 NOTE — NUR
Received pt in bed. A&O x 4. On O2 2LPM/NC. No SOB or respiratory distress. Sating at 98%. 
IV site on L AC 20 g intact and patent. Call light in reach. Left bed in lowest position. 
All needs attended. Will continue to monitor.

## 2022-07-10 NOTE — NUR
IV access on R wrist noted leaking, removed and inserted a new one on LAC G#20. Patent and 
intact with ongoing NS running at 75 ml/hr.

## 2022-07-10 NOTE — NUR
Pt asked for O2 to be lowered back to 2lpm, saying it's too strong. Saturating 97% at O2 lpm 
via NC.

## 2022-07-10 NOTE — NUR
at 1640 c/o shortness of breath spo2 94% at 2lpm nc, breathing treatment administered by Rt 
with relief. pt kept comfortable.

## 2022-07-10 NOTE — NUR
Pt complained of SOB noted with wheezing and tachycardia -115. Lizet NP made aware 
with new order of Ventolin neb Q4H.

## 2022-07-10 NOTE — NUR
awakens easily to verbal and tactile stimuli. denies pain or sob. on 2lpm nc satting 96%. iv 
hydration infusing as ordered. appears comfortable. breathing non labored. kept comfortable. 
call light in reach. needs attended.

## 2022-07-11 VITALS — DIASTOLIC BLOOD PRESSURE: 70 MMHG | SYSTOLIC BLOOD PRESSURE: 133 MMHG

## 2022-07-11 VITALS — SYSTOLIC BLOOD PRESSURE: 118 MMHG | DIASTOLIC BLOOD PRESSURE: 74 MMHG

## 2022-07-11 VITALS — DIASTOLIC BLOOD PRESSURE: 77 MMHG | SYSTOLIC BLOOD PRESSURE: 123 MMHG

## 2022-07-11 VITALS — DIASTOLIC BLOOD PRESSURE: 81 MMHG | SYSTOLIC BLOOD PRESSURE: 128 MMHG

## 2022-07-11 LAB
BUN SERPL-MCNC: 30 MG/DL (ref 7–18)
CHLORIDE SERPL-SCNC: 104 MMOL/L (ref 98–107)
CO2 SERPL-SCNC: 28 MMOL/L (ref 21–32)
CREAT SERPL-MCNC: 1.8 MG/DL (ref 0.6–1.3)
GLUCOSE SERPL-MCNC: 167 MG/DL (ref 74–106)
HCT VFR BLD AUTO: 26.6 % (ref 31.2–41.9)
MCH RBC QN AUTO: 25.7 UUG (ref 24.7–32.8)
MCV RBC AUTO: 81.1 FL (ref 75.5–95.3)
PLATELET # BLD AUTO: 415 K/UL (ref 179–408)
POTASSIUM SERPL-SCNC: 4.3 MMOL/L (ref 3.5–5.1)

## 2022-07-11 RX ADMIN — SODIUM CHLORIDE PRN UNIT: 9 INJECTION, SOLUTION INTRAVENOUS at 13:05

## 2022-07-11 RX ADMIN — LACTOBACILLUS ACIDOPH-L.BULGARICUS 1 MILLION CELL CHEWABLE TABLET SCH TAB.CHEW: at 13:02

## 2022-07-11 RX ADMIN — Medication SCH ML: at 16:39

## 2022-07-11 RX ADMIN — Medication SCH EACH: at 06:31

## 2022-07-11 RX ADMIN — Medication SCH ML: at 13:02

## 2022-07-11 RX ADMIN — Medication SCH EACH: at 11:08

## 2022-07-11 RX ADMIN — SENNOSIDES SCH TAB: 8.6 TABLET, COATED ORAL at 20:26

## 2022-07-11 RX ADMIN — POLYETHYLENE GLYCOL 3350 SCH GM: 17 POWDER, FOR SOLUTION ORAL at 08:36

## 2022-07-11 RX ADMIN — Medication SCH EACH: at 16:37

## 2022-07-11 RX ADMIN — ALBUTEROL SULFATE PRN MG: 2.5 SOLUTION RESPIRATORY (INHALATION) at 19:47

## 2022-07-11 RX ADMIN — MUPIROCIN SCH APPLIC: 20 OINTMENT TOPICAL at 08:36

## 2022-07-11 RX ADMIN — DOCUSATE SODIUM SCH MG: 100 CAPSULE, LIQUID FILLED ORAL at 08:35

## 2022-07-11 RX ADMIN — Medication SCH MG: at 08:35

## 2022-07-11 RX ADMIN — Medication SCH EACH: at 20:36

## 2022-07-11 RX ADMIN — LACTOBACILLUS ACIDOPH-L.BULGARICUS 1 MILLION CELL CHEWABLE TABLET SCH TAB.CHEW: at 08:35

## 2022-07-11 RX ADMIN — MUPIROCIN SCH APPLIC: 20 OINTMENT TOPICAL at 20:26

## 2022-07-11 RX ADMIN — DIPHENHYDRAMINE HYDROCHLORIDE PRN MG: 50 INJECTION INTRAMUSCULAR; INTRAVENOUS at 03:16

## 2022-07-11 RX ADMIN — SODIUM CHLORIDE PRN UNIT: 9 INJECTION, SOLUTION INTRAVENOUS at 08:10

## 2022-07-11 RX ADMIN — FERROUS GLUCONATE SCH MG: 324 TABLET ORAL at 08:36

## 2022-07-11 RX ADMIN — DEXTROSE SCH MLS/HR: 50 INJECTION, SOLUTION INTRAVENOUS at 03:16

## 2022-07-11 RX ADMIN — FERROUS GLUCONATE SCH MG: 324 TABLET ORAL at 20:27

## 2022-07-11 RX ADMIN — SODIUM CHLORIDE PRN UNIT: 9 INJECTION, SOLUTION INTRAVENOUS at 16:38

## 2022-07-11 RX ADMIN — SODIUM CHLORIDE PRN UNIT: 9 INJECTION, SOLUTION INTRAVENOUS at 21:58

## 2022-07-11 RX ADMIN — LACTOBACILLUS ACIDOPH-L.BULGARICUS 1 MILLION CELL CHEWABLE TABLET SCH TAB.CHEW: at 16:39

## 2022-07-11 NOTE — NUR
Pt complained of itchiness on her upper right chest, right eye and left underarm. MD ordered 
Benadryl. Will continue to monitor.

## 2022-07-11 NOTE — NUR
awake, pleasant mood. no complaints at this time. denies sob or pain. on 2lpm nc satting 95% 
breathing non labored. lac iv noted w/ no infiltration. resting comfortably. needs attended. 
cont to monitor.

## 2022-07-12 VITALS — DIASTOLIC BLOOD PRESSURE: 72 MMHG | SYSTOLIC BLOOD PRESSURE: 137 MMHG

## 2022-07-12 VITALS — DIASTOLIC BLOOD PRESSURE: 73 MMHG | SYSTOLIC BLOOD PRESSURE: 131 MMHG

## 2022-07-12 LAB
BUN SERPL-MCNC: 31 MG/DL (ref 7–18)
CHLORIDE SERPL-SCNC: 102 MMOL/L (ref 98–107)
CO2 SERPL-SCNC: 30 MMOL/L (ref 21–32)
CREAT SERPL-MCNC: 1.7 MG/DL (ref 0.6–1.3)
GLUCOSE SERPL-MCNC: 169 MG/DL (ref 74–106)
HCT VFR BLD AUTO: 24.2 % (ref 31.2–41.9)
MCH RBC QN AUTO: 25.9 UUG (ref 24.7–32.8)
MCV RBC AUTO: 81.9 FL (ref 75.5–95.3)
PLATELET # BLD AUTO: 408 K/UL (ref 179–408)
POTASSIUM SERPL-SCNC: 4.5 MMOL/L (ref 3.5–5.1)

## 2022-07-12 RX ADMIN — MUPIROCIN SCH APPLIC: 20 OINTMENT TOPICAL at 09:07

## 2022-07-12 RX ADMIN — LACTOBACILLUS ACIDOPH-L.BULGARICUS 1 MILLION CELL CHEWABLE TABLET SCH TAB.CHEW: at 17:03

## 2022-07-12 RX ADMIN — DOCUSATE SODIUM SCH MG: 100 CAPSULE, LIQUID FILLED ORAL at 09:07

## 2022-07-12 RX ADMIN — LACTOBACILLUS ACIDOPH-L.BULGARICUS 1 MILLION CELL CHEWABLE TABLET SCH TAB.CHEW: at 09:07

## 2022-07-12 RX ADMIN — Medication SCH ML: at 17:03

## 2022-07-12 RX ADMIN — Medication SCH EACH: at 12:10

## 2022-07-12 RX ADMIN — SODIUM CHLORIDE PRN UNIT: 9 INJECTION, SOLUTION INTRAVENOUS at 17:14

## 2022-07-12 RX ADMIN — FERROUS GLUCONATE SCH MG: 324 TABLET ORAL at 09:57

## 2022-07-12 RX ADMIN — POLYETHYLENE GLYCOL 3350 SCH GM: 17 POWDER, FOR SOLUTION ORAL at 09:07

## 2022-07-12 RX ADMIN — Medication SCH MG: at 09:07

## 2022-07-12 RX ADMIN — Medication SCH EACH: at 06:52

## 2022-07-12 RX ADMIN — Medication SCH ML: at 09:57

## 2022-07-12 RX ADMIN — Medication SCH EACH: at 16:30

## 2022-07-12 RX ADMIN — DEXTROSE SCH MLS/HR: 50 INJECTION, SOLUTION INTRAVENOUS at 09:00

## 2022-07-12 RX ADMIN — LACTOBACILLUS ACIDOPH-L.BULGARICUS 1 MILLION CELL CHEWABLE TABLET SCH TAB.CHEW: at 12:51

## 2022-07-12 RX ADMIN — SODIUM CHLORIDE PRN UNIT: 9 INJECTION, SOLUTION INTRAVENOUS at 12:13

## 2022-07-12 RX ADMIN — SODIUM CHLORIDE PRN UNIT: 9 INJECTION, SOLUTION INTRAVENOUS at 08:07

## 2022-07-12 RX ADMIN — DIPHENHYDRAMINE HYDROCHLORIDE PRN MG: 50 INJECTION INTRAMUSCULAR; INTRAVENOUS at 04:17

## 2022-07-12 RX ADMIN — Medication SCH EACH: at 11:36

## 2022-07-12 NOTE — NUR
AMBULANCE BY [facility] HERE AND PATIENT DISCHARGE WITH ALL HER PERSONAL BELONGINGS IN 
SATISFACTORY CONDITION.

## 2022-07-12 NOTE — NUR
Inserted IV on Right hand 22 g. Intact and patent. IV antibiotic administered. Pt complained 
of itchiness and watery eyes. Benadryl administered as ordered. Will continue to monitor.

## 2022-07-12 NOTE — NUR
CALLED THE [facility] SPOKE WITH IVY AND REPORT GIVEN TO HER FOR CONTINUING CARE 
AWAITING FOR THE AMBULANCE

## 2023-08-22 NOTE — NUR
Patient called requesting refill    amLODIPine (NORVASC) 10 MG tablet    Best call back # [phone] SEEN BY PULMONOLOGIST DR FENG AT ER. RT CALLED TO DO ABG